# Patient Record
Sex: FEMALE | Race: WHITE | Employment: UNEMPLOYED | URBAN - METROPOLITAN AREA
[De-identification: names, ages, dates, MRNs, and addresses within clinical notes are randomized per-mention and may not be internally consistent; named-entity substitution may affect disease eponyms.]

---

## 2018-09-26 PROCEDURE — 86480 TB TEST CELL IMMUN MEASURE: CPT | Performed by: INTERNAL MEDICINE

## 2018-09-26 PROCEDURE — 86704 HEP B CORE ANTIBODY TOTAL: CPT | Performed by: INTERNAL MEDICINE

## 2018-09-26 PROCEDURE — 80074 ACUTE HEPATITIS PANEL: CPT | Performed by: INTERNAL MEDICINE

## 2019-10-07 PROCEDURE — 86480 TB TEST CELL IMMUN MEASURE: CPT | Performed by: INTERNAL MEDICINE

## 2023-03-20 ENCOUNTER — HOSPITAL ENCOUNTER (OUTPATIENT)
Age: 38
Discharge: HOME | End: 2023-03-20
Payer: MEDICAID

## 2023-03-20 VITALS
OXYGEN SATURATION: 98 % | HEART RATE: 89 BPM | RESPIRATION RATE: 20 BRPM | DIASTOLIC BLOOD PRESSURE: 52 MMHG | SYSTOLIC BLOOD PRESSURE: 108 MMHG | TEMPERATURE: 97 F

## 2023-03-20 DIAGNOSIS — H66.92 LEFT OTITIS MEDIA, UNSPECIFIED OTITIS MEDIA TYPE: Primary | ICD-10-CM

## 2023-03-20 LAB — S PYO AG THROAT QL IA.RAPID: NEGATIVE

## 2023-03-20 PROCEDURE — 87651 STREP A DNA AMP PROBE: CPT | Performed by: NURSE PRACTITIONER

## 2023-03-20 PROCEDURE — 99203 OFFICE O/P NEW LOW 30 MIN: CPT

## 2023-03-20 RX ORDER — CEFDINIR 300 MG/1
300 CAPSULE ORAL 2 TIMES DAILY
Qty: 20 CAPSULE | Refills: 0 | Status: SHIPPED | OUTPATIENT
Start: 2023-03-20 | End: 2023-03-30

## 2023-03-20 NOTE — DISCHARGE INSTRUCTIONS
Tylenol or Motrin as needed for pain or fever. Take the cefdinir as prescribed. Follow-up with your doctor. Return for any concerns.

## 2023-03-22 ENCOUNTER — HOSPITAL ENCOUNTER (EMERGENCY)
Facility: HOSPITAL | Age: 38
Discharge: HOME | End: 2023-03-22
Attending: EMERGENCY MEDICINE
Payer: MEDICAID

## 2023-03-22 VITALS
HEIGHT: 61 IN | TEMPERATURE: 97 F | WEIGHT: 255 LBS | BODY MASS INDEX: 48.15 KG/M2 | SYSTOLIC BLOOD PRESSURE: 114 MMHG | RESPIRATION RATE: 16 BRPM | DIASTOLIC BLOOD PRESSURE: 70 MMHG | HEART RATE: 91 BPM | OXYGEN SATURATION: 98 %

## 2023-03-22 DIAGNOSIS — H72.92 ACUTE OTITIS MEDIA OF LEFT EAR WITH PERFORATED TYMPANIC MEMBRANE: Primary | ICD-10-CM

## 2023-03-22 DIAGNOSIS — H66.92 ACUTE OTITIS MEDIA OF LEFT EAR WITH PERFORATED TYMPANIC MEMBRANE: Primary | ICD-10-CM

## 2023-03-22 LAB
FLUAV + FLUBV RNA SPEC NAA+PROBE: NEGATIVE
FLUAV + FLUBV RNA SPEC NAA+PROBE: NEGATIVE
RSV RNA SPEC NAA+PROBE: NEGATIVE
SARS-COV-2 RNA RESP QL NAA+PROBE: NOT DETECTED

## 2023-03-22 PROCEDURE — 99284 EMERGENCY DEPT VISIT MOD MDM: CPT

## 2023-03-22 PROCEDURE — 0241U SARS-COV-2/FLU A AND B/RSV BY PCR (GENEXPERT): CPT | Performed by: EMERGENCY MEDICINE

## 2023-03-22 PROCEDURE — 99283 EMERGENCY DEPT VISIT LOW MDM: CPT

## 2023-03-22 RX ORDER — CIPROFLOXACIN AND DEXAMETHASONE 3; 1 MG/ML; MG/ML
4 SUSPENSION/ DROPS AURICULAR (OTIC) 2 TIMES DAILY
Qty: 1 EACH | Refills: 0 | Status: SHIPPED | OUTPATIENT
Start: 2023-03-22 | End: 2023-04-01

## 2023-03-22 RX ORDER — AMOXICILLIN AND CLAVULANATE POTASSIUM 875; 125 MG/1; MG/1
1 TABLET, FILM COATED ORAL 2 TIMES DAILY
Qty: 14 TABLET | Refills: 0 | Status: SHIPPED | OUTPATIENT
Start: 2023-03-22 | End: 2023-03-29

## 2023-03-22 NOTE — ED INITIAL ASSESSMENT (HPI)
Left ear pain since Saturday, was seen at [facility] and sent home with abx, states pain I worse. today reports increase throat & ear pain

## 2024-06-10 ENCOUNTER — HOSPITAL ENCOUNTER (INPATIENT)
Facility: HOSPITAL | Age: 39
LOS: 4 days | Discharge: HOME | DRG: 862 | End: 2024-06-14
Attending: EMERGENCY MEDICINE | Admitting: HOSPITALIST
Payer: MEDICAID

## 2024-06-10 ENCOUNTER — APPOINTMENT (OUTPATIENT)
Dept: CT IMAGING | Facility: HOSPITAL | Age: 39
DRG: 862 | End: 2024-06-10
Attending: EMERGENCY MEDICINE
Payer: MEDICAID

## 2024-06-10 DIAGNOSIS — D72.829 LEUKOCYTOSIS, UNSPECIFIED TYPE: ICD-10-CM

## 2024-06-10 DIAGNOSIS — K65.1 INTRA-ABDOMINAL ABSCESS (HCC): Primary | ICD-10-CM

## 2024-06-10 LAB
ALBUMIN SERPL-MCNC: 4.4 G/DL (ref 3.2–4.8)
ALBUMIN/GLOB SERPL: 1.4 {RATIO} (ref 1–2)
ALP LIVER SERPL-CCNC: 138 U/L
ALT SERPL-CCNC: 21 U/L
ANION GAP SERPL CALC-SCNC: 10 MMOL/L (ref 0–18)
AST SERPL-CCNC: 26 U/L (ref ?–34)
BASOPHILS # BLD AUTO: 0.05 X10(3) UL (ref 0–0.2)
BASOPHILS NFR BLD AUTO: 0.3 %
BILIRUB SERPL-MCNC: 0.8 MG/DL (ref 0.3–1.2)
BUN BLD-MCNC: 12 MG/DL (ref 9–23)
BUN/CREAT SERPL: 13.8 (ref 10–20)
CALCIUM BLD-MCNC: 9.2 MG/DL (ref 8.7–10.4)
CHLORIDE SERPL-SCNC: 105 MMOL/L (ref 98–112)
CO2 SERPL-SCNC: 23 MMOL/L (ref 21–32)
CREAT BLD-MCNC: 0.87 MG/DL
DEPRECATED RDW RBC AUTO: 42.6 FL (ref 35.1–46.3)
EGFRCR SERPLBLD CKD-EPI 2021: 86 ML/MIN/1.73M2 (ref 60–?)
EOSINOPHIL # BLD AUTO: 0.1 X10(3) UL (ref 0–0.7)
EOSINOPHIL NFR BLD AUTO: 0.5 %
ERYTHROCYTE [DISTWIDTH] IN BLOOD BY AUTOMATED COUNT: 11.9 % (ref 11–15)
GLOBULIN PLAS-MCNC: 3.1 G/DL (ref 2–3.5)
GLUCOSE BLD-MCNC: 178 MG/DL (ref 70–99)
GLUCOSE BLDC GLUCOMTR-MCNC: 112 MG/DL (ref 70–99)
HCT VFR BLD AUTO: 43.3 %
HGB BLD-MCNC: 14.2 G/DL
IMM GRANULOCYTES # BLD AUTO: 0.09 X10(3) UL (ref 0–1)
IMM GRANULOCYTES NFR BLD: 0.5 %
LACTATE SERPL-SCNC: 1.5 MMOL/L (ref 0.5–2)
LIPASE SERPL-CCNC: 48 U/L (ref 13–75)
LYMPHOCYTES # BLD AUTO: 3.2 X10(3) UL (ref 1–4)
LYMPHOCYTES NFR BLD AUTO: 16.1 %
MCH RBC QN AUTO: 31.5 PG (ref 26–34)
MCHC RBC AUTO-ENTMCNC: 32.8 G/DL (ref 31–37)
MCV RBC AUTO: 96 FL
MONOCYTES # BLD AUTO: 1.02 X10(3) UL (ref 0.1–1)
MONOCYTES NFR BLD AUTO: 5.1 %
NEUTROPHILS # BLD AUTO: 15.39 X10 (3) UL (ref 1.5–7.7)
NEUTROPHILS # BLD AUTO: 15.39 X10(3) UL (ref 1.5–7.7)
NEUTROPHILS NFR BLD AUTO: 77.5 %
OSMOLALITY SERPL CALC.SUM OF ELEC: 290 MOSM/KG (ref 275–295)
PLATELET # BLD AUTO: 284 10(3)UL (ref 150–450)
POTASSIUM SERPL-SCNC: 3.8 MMOL/L (ref 3.5–5.1)
PROT SERPL-MCNC: 7.5 G/DL (ref 5.7–8.2)
RBC # BLD AUTO: 4.51 X10(6)UL
SODIUM SERPL-SCNC: 138 MMOL/L (ref 136–145)
WBC # BLD AUTO: 19.9 X10(3) UL (ref 4–11)

## 2024-06-10 PROCEDURE — 99223 1ST HOSP IP/OBS HIGH 75: CPT | Performed by: HOSPITALIST

## 2024-06-10 PROCEDURE — 74177 CT ABD & PELVIS W/CONTRAST: CPT | Performed by: EMERGENCY MEDICINE

## 2024-06-10 RX ORDER — MORPHINE SULFATE 4 MG/ML
2 INJECTION, SOLUTION INTRAMUSCULAR; INTRAVENOUS EVERY 2 HOUR PRN
Status: DISCONTINUED | OUTPATIENT
Start: 2024-06-10 | End: 2024-06-14

## 2024-06-10 RX ORDER — ONDANSETRON 2 MG/ML
4 INJECTION INTRAMUSCULAR; INTRAVENOUS EVERY 6 HOURS PRN
Status: DISCONTINUED | OUTPATIENT
Start: 2024-06-10 | End: 2024-06-14

## 2024-06-10 RX ORDER — DULAGLUTIDE 3 MG/.5ML
3 INJECTION, SOLUTION SUBCUTANEOUS
COMMUNITY
Start: 2024-06-11

## 2024-06-10 RX ORDER — ACETAMINOPHEN 500 MG
500 TABLET ORAL EVERY 4 HOURS PRN
Status: DISCONTINUED | OUTPATIENT
Start: 2024-06-10 | End: 2024-06-11

## 2024-06-10 RX ORDER — SODIUM CHLORIDE 9 MG/ML
INJECTION, SOLUTION INTRAVENOUS CONTINUOUS
Status: DISCONTINUED | OUTPATIENT
Start: 2024-06-10 | End: 2024-06-12

## 2024-06-10 RX ORDER — KETOROLAC TROMETHAMINE 15 MG/ML
15 INJECTION, SOLUTION INTRAMUSCULAR; INTRAVENOUS ONCE
Status: COMPLETED | OUTPATIENT
Start: 2024-06-10 | End: 2024-06-10

## 2024-06-10 RX ORDER — NICOTINE POLACRILEX 4 MG
30 LOZENGE BUCCAL
Status: DISCONTINUED | OUTPATIENT
Start: 2024-06-10 | End: 2024-06-14

## 2024-06-10 RX ORDER — MORPHINE SULFATE 4 MG/ML
4 INJECTION, SOLUTION INTRAMUSCULAR; INTRAVENOUS EVERY 2 HOUR PRN
Status: DISCONTINUED | OUTPATIENT
Start: 2024-06-10 | End: 2024-06-14

## 2024-06-10 RX ORDER — DEXTROSE MONOHYDRATE 25 G/50ML
50 INJECTION, SOLUTION INTRAVENOUS
Status: DISCONTINUED | OUTPATIENT
Start: 2024-06-10 | End: 2024-06-14

## 2024-06-10 RX ORDER — PROCHLORPERAZINE EDISYLATE 5 MG/ML
5 INJECTION INTRAMUSCULAR; INTRAVENOUS EVERY 8 HOURS PRN
Status: DISCONTINUED | OUTPATIENT
Start: 2024-06-10 | End: 2024-06-14

## 2024-06-10 RX ORDER — EMPAGLIFLOZIN 25 MG/1
25 TABLET, FILM COATED ORAL DAILY
COMMUNITY

## 2024-06-10 RX ORDER — ARIPIPRAZOLE 15 MG/1
15 TABLET ORAL DAILY
COMMUNITY

## 2024-06-10 RX ORDER — MORPHINE SULFATE 4 MG/ML
1 INJECTION, SOLUTION INTRAMUSCULAR; INTRAVENOUS EVERY 2 HOUR PRN
Status: DISCONTINUED | OUTPATIENT
Start: 2024-06-10 | End: 2024-06-14

## 2024-06-10 RX ORDER — NICOTINE POLACRILEX 4 MG
15 LOZENGE BUCCAL
Status: DISCONTINUED | OUTPATIENT
Start: 2024-06-10 | End: 2024-06-14

## 2024-06-10 RX ORDER — TEMAZEPAM 15 MG/1
15 CAPSULE ORAL NIGHTLY PRN
Status: DISCONTINUED | OUTPATIENT
Start: 2024-06-10 | End: 2024-06-14

## 2024-06-10 RX ORDER — MELOXICAM 15 MG/1
15 TABLET ORAL DAILY PRN
COMMUNITY

## 2024-06-10 RX ORDER — MORPHINE SULFATE 4 MG/ML
4 INJECTION, SOLUTION INTRAMUSCULAR; INTRAVENOUS ONCE
Status: COMPLETED | OUTPATIENT
Start: 2024-06-10 | End: 2024-06-10

## 2024-06-10 NOTE — H&P
[facility]    PATIENT'S NAME: ALFRED ESTRADA   ATTENDING PHYSICIAN: Chang Royal MD   PATIENT ACCOUNT#:   [id number]    LOCATION:  [address]  MEDICAL RECORD #:   [id number]       YOB: 1985  ADMISSION DATE:       06/10/2024    HISTORY AND PHYSICAL EXAMINATION    CHIEF COMPLAINT:  Intraabdominal abscess.    HISTORY OF PRESENT ILLNESS:  Patient is a 38-year-old  female who presented today to the emergency department for evaluation of progressive lower abdominal pain for the last 5 days.  CBC showed white blood cell count of 19.9 with left shift.  Chemistry and liver function tests were unremarkable.  Lipase was negative.  CT scan of the abdomen showed 3.8 x 3.4 x 4.2 cm localized fluid collection in the midline of the lower pelvis inferior to the distal sigmoid colon and superior to the vaginal cuff with surrounding fat stranding, collection is concerning for an abscess.  The adjacent rectosigmoid wall is mildly thickened with some adjacent fat stranding, could be reactive.  No evidence of pneumoperitoneum or pneumatosis.  Patient was started on IV Zosyn.  She will be admitted to the hospital for further management.    PAST MEDICAL HISTORY:  Obesity, diabetes mellitus type 2, hypertension, hyperlipidemia, psoriatic arthritis, obstructive sleep apnea.    PAST SURGICAL HISTORY:  Total abdominal hysterectomy, bilateral salpingo-oophorectomy for early-stage endometrial cancer.     MEDICATIONS:  Please see medication reconciliation list.      ALLERGIES:  No known drug allergies.    FAMILY HISTORY:  Positive for hypertension and diabetes mellitus type 2.    SOCIAL HISTORY:  Smokes less than 1/2 pack a day.  Rare alcohol use.  No drug use.      REVIEW OF SYSTEMS:  Patient reports around 2 weeks ago she had vaginal intercourse for the first time since her hysterectomy last year and, during intercourse and after, she had profuse vaginal bleeding which eventually stopped and ever  since she has been having deep discomfort in her pelvis which has been progressive.  It became worse in the last 5 days, feeling bloated and constipated.  She had some night sweats.  Other 12-point review of systems is negative.        PHYSICAL EXAMINATION:    GENERAL:  Alert, oriented to time, place, and person.  Moderate distress.   VITAL SIGNS:  Temperature 98.0, pulse 103, respiratory rate 18, blood pressure 133/57, pulse ox 96% on room air.    HEENT:  Atraumatic.  Oropharynx clear.  Dry mucous membranes.   NECK:  Supple.  No lymphadenopathy.  Trachea midline.  Full range of motion.    LUNGS:  Clear to auscultation bilaterally.  Normal respiratory effort.   HEART:  Regular rate and rhythm.  S1, S2 auscultated.  No murmur.    ABDOMEN:  Soft, nondistended.  Tenderness noted mid-lower abdomen to deep palpation.  No guarding or rebound tenderness.  Hypoactive bowel sounds.    EXTREMITIES:  No peripheral edema, clubbing, or cyanosis.    NEUROLOGIC:  Motor and sensory intact.    ASSESSMENT:    1.   Midline deep pelvic abscess between vaginal cuff and inferior aspect of the sigmoid colon with thickening of the sigmoid colon adjacent to the fluid collection, possibly reactive.  Rule out vaginal cuff rupture from above-mentioned history.  2.   Diabetes mellitus type 2.  3.   Obesity.  4.   Hypertension.    PLAN:  Patient will be admitted to general medical floor.  IV Zosyn, IV fluids, clear liquid diet.  Interventional radiology consult to evaluate patient for drain catheter.  Also obtain OB/GYN consult and general surgery consult.  Monitor hemodynamic status.  Monitor Accu-Cheks.  Pain control.  Further recommendations to follow.    Dictated By Shahzad Larson MD  d: 06/10/2024 18:21:49  t: 06/10/2024 18:28:35  Job [id number]  /

## 2024-06-10 NOTE — ED INITIAL ASSESSMENT (HPI)
Patient complains of lower abd pain and lower back pain which has been progressive for the past few days, states she had a hysterectomy last year, she had had intercourse for the first time post hysterectomy and began having bleeding, was checked for that but states it still hurts

## 2024-06-11 ENCOUNTER — APPOINTMENT (OUTPATIENT)
Dept: PICC SERVICES | Facility: HOSPITAL | Age: 39
DRG: 862 | End: 2024-06-11
Attending: HOSPITALIST
Payer: MEDICAID

## 2024-06-11 LAB
ANION GAP SERPL CALC-SCNC: 6 MMOL/L (ref 0–18)
BASOPHILS # BLD AUTO: 0.04 X10(3) UL (ref 0–0.2)
BASOPHILS NFR BLD AUTO: 0.2 %
BUN BLD-MCNC: 6 MG/DL (ref 9–23)
BUN/CREAT SERPL: 8.1 (ref 10–20)
CALCIUM BLD-MCNC: 8.7 MG/DL (ref 8.7–10.4)
CHLORIDE SERPL-SCNC: 107 MMOL/L (ref 98–112)
CO2 SERPL-SCNC: 26 MMOL/L (ref 21–32)
CREAT BLD-MCNC: 0.74 MG/DL
DEPRECATED RDW RBC AUTO: 41.3 FL (ref 35.1–46.3)
EGFRCR SERPLBLD CKD-EPI 2021: 105 ML/MIN/1.73M2 (ref 60–?)
EOSINOPHIL # BLD AUTO: 0.17 X10(3) UL (ref 0–0.7)
EOSINOPHIL NFR BLD AUTO: 1.1 %
ERYTHROCYTE [DISTWIDTH] IN BLOOD BY AUTOMATED COUNT: 11.6 % (ref 11–15)
EST. AVERAGE GLUCOSE BLD GHB EST-MCNC: 134 MG/DL (ref 68–126)
GLUCOSE BLD-MCNC: 151 MG/DL (ref 70–99)
GLUCOSE BLDC GLUCOMTR-MCNC: 106 MG/DL (ref 70–99)
GLUCOSE BLDC GLUCOMTR-MCNC: 138 MG/DL (ref 70–99)
GLUCOSE BLDC GLUCOMTR-MCNC: 158 MG/DL (ref 70–99)
GLUCOSE BLDC GLUCOMTR-MCNC: 200 MG/DL (ref 70–99)
HBA1C MFR BLD: 6.3 % (ref ?–5.7)
HCT VFR BLD AUTO: 39.2 %
HGB BLD-MCNC: 12.7 G/DL
IMM GRANULOCYTES # BLD AUTO: 0.08 X10(3) UL (ref 0–1)
IMM GRANULOCYTES NFR BLD: 0.5 %
LYMPHOCYTES # BLD AUTO: 3.04 X10(3) UL (ref 1–4)
LYMPHOCYTES NFR BLD AUTO: 18.8 %
MCH RBC QN AUTO: 31.4 PG (ref 26–34)
MCHC RBC AUTO-ENTMCNC: 32.4 G/DL (ref 31–37)
MCV RBC AUTO: 96.8 FL
MONOCYTES # BLD AUTO: 1.13 X10(3) UL (ref 0.1–1)
MONOCYTES NFR BLD AUTO: 7 %
NEUTROPHILS # BLD AUTO: 11.67 X10 (3) UL (ref 1.5–7.7)
NEUTROPHILS # BLD AUTO: 11.67 X10(3) UL (ref 1.5–7.7)
NEUTROPHILS NFR BLD AUTO: 72.4 %
OSMOLALITY SERPL CALC.SUM OF ELEC: 289 MOSM/KG (ref 275–295)
PLATELET # BLD AUTO: 277 10(3)UL (ref 150–450)
POTASSIUM SERPL-SCNC: 4 MMOL/L (ref 3.5–5.1)
RBC # BLD AUTO: 4.05 X10(6)UL
SODIUM SERPL-SCNC: 139 MMOL/L (ref 136–145)
WBC # BLD AUTO: 16.1 X10(3) UL (ref 4–11)

## 2024-06-11 PROCEDURE — 99233 SBSQ HOSP IP/OBS HIGH 50: CPT | Performed by: HOSPITALIST

## 2024-06-11 RX ORDER — HYDROCODONE BITARTRATE AND ACETAMINOPHEN 7.5; 325 MG/1; MG/1
1 TABLET ORAL EVERY 6 HOURS PRN
Status: DISCONTINUED | OUTPATIENT
Start: 2024-06-11 | End: 2024-06-14

## 2024-06-11 RX ORDER — HEPARIN SODIUM 5000 [USP'U]/ML
5000 INJECTION, SOLUTION INTRAVENOUS; SUBCUTANEOUS EVERY 8 HOURS SCHEDULED
Status: DISCONTINUED | OUTPATIENT
Start: 2024-06-11 | End: 2024-06-14

## 2024-06-11 RX ORDER — ACETAMINOPHEN 500 MG
500 TABLET ORAL EVERY 4 HOURS PRN
Status: DISCONTINUED | OUTPATIENT
Start: 2024-06-11 | End: 2024-06-14

## 2024-06-11 NOTE — PLAN OF CARE
No acute changes. Vaginal fluid discharge early afternoon, IR notified. Up independently in room. All safety measures in place.    Problem: Patient Centered Care  Goal: Patient preferences are identified and integrated in the patient's plan of care  Description: Interventions:  - What would you like us to know as we care for you? Home with family  - Provide timely, complete, and accurate information to patient/family  - Incorporate patient and family knowledge, values, beliefs, and cultural backgrounds into the planning and delivery of care  - Encourage patient/family to participate in care and decision-making at the level they choose  - Honor patient and family perspectives and choices  Outcome: Progressing       Problem: GASTROINTESTINAL - ADULT  Goal: Maintains or returns to baseline bowel function  Description: INTERVENTIONS:  - Assess bowel function  - Maintain adequate hydration with IV or PO as ordered and tolerated  - Evaluate effectiveness of GI medications  - Encourage mobilization and activity  - Obtain nutritional consult as needed  - Establish a toileting routine/schedule  - Consider collaborating with pharmacy to review patient's medication profile  Outcome: Progressing     Problem: METABOLIC/FLUID AND ELECTROLYTES - ADULT  Goal: Glucose maintained within prescribed range  Description: INTERVENTIONS:  - Monitor Blood Glucose as ordered  - Assess for signs and symptoms of hyperglycemia and hypoglycemia  - Administer ordered medications to maintain glucose within target range  - Assess barriers to adequate nutritional intake and initiate nutrition consult as needed  - Instruct patient on self management of diabetes  Outcome: Progressing     Problem: PAIN - ADULT  Goal: Verbalizes/displays adequate comfort level or patient's stated pain goal  Description: INTERVENTIONS:  - Encourage pt to monitor pain and request assistance  - Assess pain using appropriate pain scale  - Administer analgesics based on type  and severity of pain and evaluate response  - Implement non-pharmacological measures as appropriate and evaluate response  - Consider cultural and social influences on pain and pain management  - Manage/alleviate anxiety  - Utilize distraction and/or relaxation techniques  - Monitor for opioid side effects  - Notify MD/LIP if interventions unsuccessful or patient reports new pain  - Anticipate increased pain with activity and pre-medicate as appropriate  Outcome: Progressing     Problem: SAFETY ADULT - FALL  Goal: Free from fall injury  Description: INTERVENTIONS:  - Assess pt frequently for physical needs  - Identify cognitive and physical deficits and behaviors that affect risk of falls.  - [place] fall precautions as indicated by assessment.  - Educate pt/family on patient safety including physical limitations  - Instruct pt to call for assistance with activity based on assessment  - Modify environment to reduce risk of injury  - Provide assistive devices as appropriate  - Consider OT/PT consult to assist with strengthening/mobility  - Encourage toileting schedule  Outcome: Progressing

## 2024-06-11 NOTE — PROGRESS NOTES
[facility]  part of [facility]    OB/GYNE Progress Note      Linda Unger Patient Status:  Inpatient    1985 MRN [id number]   Location [facility] 1W Attending Amy Akers MD   Hosp Day # 1 PCP ALEXEY MANZANARES     Assessment & Plan:     Intra-abdominal abscess (HCC)  Suspect spontaneous drainage.  Continue abx and plan repeat imaging in few days      Leukocytosis, unspecified type  improved          Plan discussed with patient who verbalizes understanding and agreement.    Subjective:     Reports feeling better after spontaneous drainage of fluid.          Objective:   Vital signs in last 24 hours:  Temp:  [97.7 °F (36.5 °C)-99.3 °F (37.4 °C)] 98.9 °F (37.2 °C)  Pulse:  [] 96  Resp:  [18] 18  BP: (106-128)/(57-77) 109/57  SpO2:  [92 %-97 %] 92 %    Input/Output:    Intake/Output Summary (Last 24 hours) at 2024 1616  Last data filed at 2024 1532  Gross per 24 hour   Intake 1340 ml   Output --   Net 1340 ml       Weight (Last 6):  Wt Readings from Last 6 Encounters:   06/10/24 233 lb 9.6 oz (106 kg)   23 255 lb (115.7 kg)   20 249 lb (112.9 kg)   10/07/19 234 lb (106.1 kg)   04/15/19 251 lb (113.9 kg)   19 264 lb (119.7 kg)     Body mass index is 44.14 kg/m².         Gaxiola Catheter Information  Does patient have a gaxiola catheter: no          Constitutional: comfortable  Genitourinary: Labia: normal  Vagina: no discharge noted, cuff intact to palpation and visually.       Results:   Lab Results   Component Value Date    WBC 16.1 (H) 2024    HGB 12.7 2024    HCT 39.2 2024    .0 2024    CREATSERUM 0.74 2024    BUN 6 (L) 2024     2024    K 4.0 2024     2024    CO2 26.0 2024     (H) 2024    CA 8.7 2024    ALB 4.4 06/10/2024    ALKPHO 138 (H) 06/10/2024    BILT 0.8 06/10/2024    TP 7.5 06/10/2024    AST 26 06/10/2024    ALT 21 06/10/2024    LIP  48 06/10/2024       Lab Results   Component Value Date    ESRML 5 02/24/2020    CRP 0.30 02/24/2020       CT ABDOMEN+PELVIS(CONTRAST ONLY)(CPT=74177)    Result Date: 6/10/2024  CONCLUSION:   3.8 x 3.4 x 4.2 cm localized fluid collection in the midline of the lower pelvis inferior to the distal sigmoid colon and superior to the vaginal cuff with surrounding fat stranding.  This collection is concerning for an abscess.  The adjacent rectosigmoid wall is mildly thickened with some adjacent fat stranding which could be reactive or reflect a nonspecific colitis.  No evidence of pneumoperitoneum or pneumatosis.    Dictated by (CST): Robert Arroyo MD on 6/10/2024 at 5:32 PM     Finalized by (CST): Robert Arroyo MD on 6/10/2024 at 5:42 PM              Zo Jose MD  6/11/2024  4:16 PM

## 2024-06-11 NOTE — PROGRESS NOTES
GENERAL SURGEON  PT SEEN    CONSULT TO BE DICTATED  SOURCE OF ABSCESS?  DISCUSSED WITH DR. HAIRSTON

## 2024-06-11 NOTE — PROGRESS NOTES
[facility]  part of [facility]    Progress Note    Linda Unger Patient Status:  Inpatient    1985 MRN [id number]   Location [facility] 1W Attending Amy Akers MD   Hosp Day # 1 PCP ALEXEY MANZANARES     Chief Complaint: intra-abd abscess    SUBJECTIVE:    No acute events overnight.  Patient denies chest pain, sob.  No fevers/chills.  No n/v.  No worsening abd pain.      10 ROS completed and otherwise negative.    OBJECTIVE:  Vital signs in last 24 hours:  /70 (BP Location: Right arm)   Pulse 100   Temp 99.3 °F (37.4 °C) (Oral)   Resp 18   Ht 5' 1\" (1.549 m)   Wt 233 lb 9.6 oz (106 kg)   SpO2 93%   BMI 44.14 kg/m²     Intake/Output:    Intake/Output Summary (Last 24 hours) at 2024 1212  Last data filed at 2024 1100  Gross per 24 hour   Intake 840 ml   Output --   Net 840 ml       Wt Readings from Last 3 Encounters:   06/10/24 233 lb 9.6 oz (106 kg)   23 255 lb (115.7 kg)   20 249 lb (112.9 kg)       Exam     Gen: No acute distress  Pulm: Lungs clear, normal respiratory effort  CV: Heart with regular rate and rhythm  Abd: Abdomen soft, nontender, bowel sounds present  Neuro: No acute focal deficits  MSK: moves extremities  Skin: Warm and dry  Psych: Normal affect  Ext: no c/c/e    Data Review:     Labs:   Lab Results   Component Value Date    WBC 16.1 2024    HGB 12.7 2024    HCT 39.2 2024    .0 2024    CREATSERUM 0.74 2024    BUN 6 2024     2024    K 4.0 2024     2024    CO2 26.0 2024     2024    CA 8.7 2024    ALB 4.4 06/10/2024    ALKPHO 138 06/10/2024    BILT 0.8 06/10/2024    TP 7.5 06/10/2024    AST 26 06/10/2024    ALT 21 06/10/2024    LIP 48 06/10/2024       Imaging: Reviewed    CT ABDOMEN+PELVIS(CONTRAST ONLY)(CPT=74177)    Result Date: 6/10/2024  CONCLUSION:   3.8 x 3.4 x 4.2 cm localized fluid collection in the midline of  the lower pelvis inferior to the distal sigmoid colon and superior to the vaginal cuff with surrounding fat stranding.  This collection is concerning for an abscess.  The adjacent rectosigmoid wall is mildly thickened with some adjacent fat stranding which could be reactive or reflect a nonspecific colitis.  No evidence of pneumoperitoneum or pneumatosis.    Dictated by (CST): Robert Arroyo MD on 6/10/2024 at 5:32 PM     Finalized by (CST): Robert Arroyo MD on 6/10/2024 at 5:42 PM           Meds:   Current Facility-Administered Medications   Medication Dose Route Frequency    acetaminophen (Tylenol Extra Strength) tab 500 mg  500 mg Oral Q4H PRN    sodium chloride 0.9% infusion   Intravenous Continuous    ondansetron (Zofran) 4 MG/2ML injection 4 mg  4 mg Intravenous Q6H PRN    prochlorperazine (Compazine) 10 MG/2ML injection 5 mg  5 mg Intravenous Q8H PRN    morphINE PF 4 MG/ML injection 1 mg  1 mg Intravenous Q2H PRN    Or    morphINE PF 4 MG/ML injection 2 mg  2 mg Intravenous Q2H PRN    Or    morphINE PF 4 MG/ML injection 4 mg  4 mg Intravenous Q2H PRN    temazepam (Restoril) cap 15 mg  15 mg Oral Nightly PRN    piperacillin-tazobactam (Zosyn) 3.375 g in dextrose 5% 100 mL IVPB-ADDV  3.375 g Intravenous Q8H    glucose (Dex4) 15 GM/59ML oral liquid 15 g  15 g Oral Q15 Min PRN    Or    glucose (Glutose) 40% oral gel 15 g  15 g Oral Q15 Min PRN    Or    glucose-vitamin C (Dex-4) chewable tab 4 tablet  4 tablet Oral Q15 Min PRN    Or    dextrose 50% injection 50 mL  50 mL Intravenous Q15 Min PRN    Or    glucose (Dex4) 15 GM/59ML oral liquid 30 g  30 g Oral Q15 Min PRN    Or    glucose (Glutose) 40% oral gel 30 g  30 g Oral Q15 Min PRN    Or    glucose-vitamin C (Dex-4) chewable tab 8 tablet  8 tablet Oral Q15 Min PRN    insulin aspart (NovoLOG) 100 Units/mL FlexPen 1-7 Units  1-7 Units Subcutaneous TID CC         Assessment  Patient Active Problem List   Diagnosis    Psoriasis    Psoriatic arthritis (HCC)     Pre-diabetes    Glaucoma    Intra-abdominal abscess (HCC)    Leukocytosis, unspecified type       Plan:     Deep pelvic abscess  - visualized on ct abdomen  - started IV abx  - Gen Sx, IR and Ob/Gyn consulted  - seen by IR - not a candidate for IR drain at this time  - will cont IV abx for now and consider repeat CT abd/pelvis in 2-3 days  - encouraged activity as tolerated and IS    Morbid obesity   - BMI 44  - diet and exercise when more stable     DM type 2  - ISS  - accuchecks and adjust insulin as needed    HTN  - stable    HL  - fu as outpt    SARAH  - cont outpt follow up     Psoriatic Arthritis  - fu as outpt    Prophylaxis:   DVT with heparin    Dispo: pending clinical course    Global A/P  - reviewed labs and vitals from today  - reviewed notes of the day  - cbc, bmp, mag ordered for tomorrow  - discussed need to stay in the hospital today due to absce  - cont IV abx  - discussed with patient/RN and care team    MDM: high level, acute illness posing a threat to life, IV medication requiring close monitoring in hospital.     Amy Akers MD  6/11/2024  12:12 PM

## 2024-06-11 NOTE — DISCHARGE INSTRUCTIONS
Discussed with your primary care physician when you see them regarding setting up colonoscopy    --------------    Resources to help you    Smoking Cessation  [phone]    Food Pantries    [facility] [facility]  [address] [place] [address]    The Food Pantry by [facility]  [phone], ext [phone]    [place] Food [employer] and Clothes Closet  [address] [place] [address]    Travel    [employer] Medicaid Provider  [phone]    Emergency Assistance Services  By the [place]  [email or web address]    Utilities  H20 Help to Others Program  [phone]    [employer] Program  [address] [place] [address]      Infusion Due next on 6/15/24 at 1030  [facility]  Phone: [phone]

## 2024-06-11 NOTE — ED QUICK NOTES
Orders for admission, patient is aware of plan and ready to go upstairs. Any questions, please call ED RN Yovani at extension [phone].     Patient Covid vaccination status: Fully vaccinated     COVID Test Ordered in ED: None    COVID Suspicion at Admission: N/A    Running Infusions:      Mental Status/LOC at time of transport: a/o x 4    Other pertinent information:   CIWA score: N/A   NIH score:  N/A

## 2024-06-11 NOTE — CONSULTS
[facility]  part of [facility]    Ob/Gynl Consult    Linda Unger Patient Status:  Inpatient    1985 MRN [id number]   Location [facility] 1W Attending Shahzad Larson MD   Hosp Day # 0 PCP ALEXEY MANZANARES       Date of Admission:  6/10/2024    SUBJECTIVE:    Reason for Admission:  Pelvic pain, pelvic abscess    History of Present Illness:  38 year old female G0 presents to the ER complaining of worsening pelvic pain particularly with bowel movement or urination.  Patient with a history of total laparoscopic hysterectomy in  secondary to stage one Ovarian and Cervical cancer per patient.  She has been seeing Dr. Del Angel who is a gyne oncologist through \[facility]".    Patient states that about five days ago, she had a sudden onset of constipation and pelvic pain, felt like \"insides coming out\".  Denied fever or abnormal vaginal discharge.    The following history patient does not want discussed in front of any family or her boyfriend:  Patient had intercourse 24 with a different partner, states that at one point she felt a \"pop\" and had sudden onset of severe pelvic pain.  She went to the [facility] ER on 24 (can see records in Care Everywhere - [place]).  At that time, the GC/Chlamydia was negative, Trichomonas was negative.  Her WBC was 15.5.  She only had a flat plate x ray of the abdomen.  Per the ER doctor's note, the vagina showed only white discharge with no blood.    Medications:  Medications Prior to Admission   Medication Sig Dispense Refill Last Dose    STELARA 45 MG/0.5ML Subcutaneous Solution Prefilled Syringe Inject 45 mg into the skin every 3 (three) months. 1 Syringe 1     Doxycycline Hyclate 100 MG Oral Tab TK 1 T PO BID FOR ACNE AND CYSTS       calcipotriene 0.005 % External Cream JORDAN EXT AA 1 TO 2 TIMES A DAY       cetirizine 10 MG Oral Tab TK 1 T PO QD       losartan 100 MG Oral Tab TK 1 T PO  QAM       baclofen 20 MG Oral Tab   4     Flunisolide 25  MCG/ACT (0.025%) Nasal Solution   4     ergocalciferol 68341 units Oral Cap TK 1 C PO Q SUNDAY FOR GOOD HEALTH  2     lisinopril 5 MG Oral Tab TK 1 T PO QD FOR BP  0     MetFORMIN HCl 1000 MG Oral Tab TK 1 T PO  BID  2     Clobetasol Propionate 0.05 % External Ointment JORDAN AA BID  2     Calcipotriene 0.005 % External Solution APPLY TO SCALP BID FOR PSORIASIS  2     ALPRAZolam 0.5 MG Oral Tab   0     Glucose Blood (ASHLEY CONTOUR NEXT TEST) In Vitro Strip U 1 STRIP BID UTD  5     HydrOXYzine HCl 25 MG Oral Tab TK 1 T PO  QPM AFTER DINNER FOR ITCHING AND SINUSES  0     MICROLET LANCETS Does not apply Misc U 1 LANCET BID UTD  5     BuPROPion HCl ER, XL, (WELLBUTRIN XL) 300 MG Oral Tablet 24 Hr Take 300 mg by mouth daily.       HYDROcodone-acetaminophen  MG Oral Tab Take 1 tablet by mouth 2 (two) times daily.       Cholecalciferol (VITAMIN D3) 10293 UNITS Oral Cap Take by mouth. 1 capsule weekly       Clobetasol Propionate 0.05 % External Cream Apply topically.          Allergies:  No Known Allergies     Past Medical History:  Past Medical History:    Anxiety    ANA II (cervical intraepithelial neoplasia II)    Depression    Glaucoma    PCOS (polycystic ovarian syndrome)    Pre-diabetes    Psoriasis    Psoriatic arthritis (HCC)       Past Surgical History:  Past Surgical History:   Procedure Laterality Date    Tonsillectomy     10/2023 - TL/BSO done at [place] [address] - I&D abscess on right thigh  LEEP    Past OB History:  OB History   No obstetric history on file.       Past GYN History:   Hx of ovarian and cervical cancer per patient, s/p TLH/ BSO    Social History:  Social History     Tobacco Use    Smoking status: Every Day    Smokeless tobacco: Never   Substance Use Topics    Alcohol use: No     Alcohol/week: 0.0 standard drinks of alcohol        Review of Systems:  Symptoms as per HPI    OBJECTIVE:  Temp:  [97.7 °F (36.5 °C)-98 °F (36.7 °C)] 97.7 °F (36.5 °C)  Pulse:  [] 88  Resp:  [18] 18  BP:  (106-133)/(57-77) 106/77  SpO2:  [95 %-98 %] 95 %    Intake/Output Summary (Last 24 hours) at 6/10/2024 2209  Last data filed at 6/10/2024 1925  Gross per 24 hour   Intake 100 ml   Output --   Net 100 ml       Physical Exam:  Abdomen:  Soft, mild tenderness to palpation central lower pelvic, no rebound or guarding  Pelvic: external genitalia normal and speculum exam performed, small amount of white/yellow discharge, vaginal cuff could not be clearly visualized as patient couldn't tolerate opening the speculum further, but no blood noted in the vagina.    Diagnostics:  CT scan:  ABDOMINAL NODES: No mass or adenopathy.    BOWEL/MESENTERY: No dilated loops of bowel.  The appendix is unremarkable.  Just caudal to the distal sigmoid, there is an ill-defined hypodensity measuring 3.8 x 3.4 x 4.2 cm there is fat stranding surrounding this hypodense structure concerning for   fluid collection/abscess.  No definite air seen within this localized fluid collection.  This fluid collection appears to be some located above the level of the vaginal cuff.  The adjacent distal sigmoid colon and wall appears mildly thickened and there   is some mild adjacent fat stranding.  There is no evidence of pneumatosis or pneumoperitoneum.     Data Review:   Lab Results   Component Value Date    WBC 19.9 06/10/2024    HGB 14.2 06/10/2024    HCT 43.3 06/10/2024    .0 06/10/2024    CREATSERUM 0.87 06/10/2024    BUN 12 06/10/2024     06/10/2024    K 3.8 06/10/2024     06/10/2024    CO2 23.0 06/10/2024     06/10/2024    CA 9.2 06/10/2024    ALB 4.4 06/10/2024    ALKPHO 138 06/10/2024    BILT 0.8 06/10/2024    TP 7.5 06/10/2024    AST 26 06/10/2024    ALT 21 06/10/2024    LIP 48 06/10/2024       ASSESSMENT:  Pelvic pain  Pelvic abscess    PLAN:  Unclear etiology of her pelvic abscess, possibly there was trauma to the vaginal cuff with intercourse which ultimately led to a vaginal cuff cellulitis and abscess.  Or, the  etiology is from the sigmoid.  In either case, agree with starting antibiotics and interventional radiology assessment for drainage of abscess.    All of the findings and plan were discussed with the patient.  She notes understanding and agrees with the plan of care.  All questions were answered to the best of my ability at this time.    Zo Malloy MD  6/10/2024  10:09 PM

## 2024-06-11 NOTE — CM/SW NOTE
06/11/24 1158   SDOH Outreach Status   SDOH Referred for Food Resource;Transportation;Tobacco/Alcohol/Substance;Utilities   SDOH Resource Details   SDOH Resource (Transportation) Transport services through Medicaid, Emergency Assistance Services by the [facility],   SDOH Resource (Food) Food Pantries [employer] [facility], [facility], [place] Food [employer] and Clothes Closet   SDOH Resource (Utilities) [employer] Program, Low Income Home Energy Assistance Program   SDOH Resource (Tobacco/Alcohol/Substance) Smoking Cessaton Support     Information provided on AVS for patient to manage at [place].    Awilda Milan MBA BSN RN CRRN   RN Case Manager  [phone]

## 2024-06-11 NOTE — ED PROVIDER NOTES
Patient Seen in: [facility] 1w    History     Chief Complaint   Patient presents with    Abdomen/Flank Pain     Stated Complaint: back and abdominal pain    HPI    Patient complains of bilateral lower abdominal pain that began couple days ago.  Reports 2 weeks ago she had intercourse for the first time since having hysterectomy a year ago.  She states it was extremely painful, had some vaginal bleeding afterward as well.  Was evaluated seen in outside ER.  States she got a little bit better but then over the past few days pain is returned and intensified..  Pain described as sharp aching.  Pain rated as 9/10.  Modifying factors include: none.          Past Medical History:    Anxiety    ANA II (cervical intraepithelial neoplasia II)    Depression    Glaucoma    PCOS (polycystic ovarian syndrome)    Pre-diabetes    Psoriasis    Psoriatic arthritis (HCC)       Past Surgical History:   Procedure Laterality Date    Tonsillectomy              Family History   Family history unknown: Yes       Social History     Socioeconomic History    Marital status: Single   Tobacco Use    Smoking status: Every Day    Smokeless tobacco: Never   Vaping Use    Vaping status: Never Used   Substance and Sexual Activity    Alcohol use: No     Alcohol/week: 0.0 standard drinks of alcohol    Drug use: Yes     Types: Cannabis     Social Determinants of Health     Financial Resource Strain: High Risk (2/28/2024)    Received from [facility]    Overall Financial Resource Strain (CARDIA)     Difficulty of Paying Living Expenses: Very hard   Food Insecurity: Food Insecurity Present (2/28/2024)    Received from [facility]    Hunger Vital Sign     Worried About Running Out of Food in the Last Year: Often true     Ran Out of Food in the Last Year: Often true   Transportation Needs: Unmet Transportation Needs (2/28/2024)    Received from [facility]    PRAPARE - Transportation      Lack of Transportation (Medical): Yes     Lack of Transportation (Non-Medical): Yes   Housing Stability: High Risk (2/28/2024)    Received from [facility]    Housing Stability Vital Sign     Unable to Pay for Housing in the Last Year: Yes     In the last 12 months, was there a time when you did not have a steady place to sleep or slept in a shelter (including now)?: No       Review of Systems    Positive for stated complaint: back and abdominal pain  Other systems are as noted in HPI.  Constitutional and vital signs reviewed.      All other systems reviewed and negative except as noted above.    PSFH elements reviewed from today and agreed except as otherwise stated in HPI.    Physical Exam     ED Triage Vitals [06/10/24 1505]   /57   Pulse 113   Resp 18   Temp 98 °F (36.7 °C)   Temp src Oral   SpO2 98 %   O2 Device None (Room air)       Current:/77 (BP Location: Right arm)   Pulse 88   Temp 97.7 °F (36.5 °C) (Oral)   Resp 18   Ht 154.9 cm (5' 1\")   Wt 105.9 kg   SpO2 95%   BMI 44.10 kg/m²   Pulse ox nl        Physical Exam  General Appearance: alert, no distress  Eyes: pupils equal and round no pallor or injection  ENT, Mouth: mucous membranes moist  Respiratory: there are no retractions, lungs are clear to auscultation  Cardiovascular: regular rate and rhythm    Gastrointestinal: obese, soft tender blq and suprapubic region with vol guarding  Neurological: II-XII grossly intact  no focal deficits  Skin: warm and dry, no rashes.  Musculoskeletal: neck is supple non tender        Extremities are symmetrical, full range of motion  Psychiatric: patient is pleasant, there is no agitation    DIFFERENTIAL DIAGNOSIS: After history and physical exam differential diagnosis was considered for diverticulitis versus cystitis versus PID          ED Course     Labs Reviewed   COMP METABOLIC PANEL (14) - Abnormal; Notable for the following components:       Result Value    Glucose 178 (*)      Alkaline Phosphatase 138 (*)     All other components within normal limits   CBC W/ DIFFERENTIAL - Abnormal; Notable for the following components:    WBC 19.9 (*)     Neutrophil Absolute Prelim 15.39 (*)     Neutrophil Absolute 15.39 (*)     Monocyte Absolute 1.02 (*)     All other components within normal limits   LIPASE - Normal   LACTIC ACID, PLASMA - Normal   CBC WITH DIFFERENTIAL WITH PLATELET    Narrative:     The following orders were created for panel order CBC With Differential With Platelet.  Procedure                               Abnormality         Status                     ---------                               -----------         ------                     CBC W/ DIFFERENTIAL[[id number]]          Abnormal            Final result                 Please view results for these tests on the individual orders.   BLOOD CULTURE   BLOOD CULTURE   AEROBIC BACTERIAL CULTURE   ANAEROBIC CULTURE       MDM         Cardiac Monitor:   Pulse Readings from Last 1 Encounters:   06/10/24 88   , sinus, 88  interpreted by me.    Radiology findings:  I personally reviewed the images. CT ABDOMEN+PELVIS(CONTRAST ONLY)(CPT=74177)    Result Date: 6/10/2024  CONCLUSION:   3.8 x 3.4 x 4.2 cm localized fluid collection in the midline of the lower pelvis inferior to the distal sigmoid colon and superior to the vaginal cuff with surrounding fat stranding.  This collection is concerning for an abscess.  The adjacent rectosigmoid wall is mildly thickened with some adjacent fat stranding which could be reactive or reflect a nonspecific colitis.  No evidence of pneumoperitoneum or pneumatosis.    Dictated by (CST): Robert Arroyo MD on 6/10/2024 at 5:32 PM     Finalized by (CST): Robert Arroyo MD on 6/10/2024 at 5:42 PM                 Medical Decision Making  Problems Addressed:  Intra-abdominal abscess (HCC): acute illness or injury     Details: Unclear if this is related to recent traumatic intercourse and is at the  vaginal cuff versus connected to the sigmoid colon.  Consulted surgery Dr. Ku and Dr. Malloy from OB.  Also spoke with IR Dr. Grayson Dave in the ER for admission IV antibiotics initiated.    Amount and/or Complexity of Data Reviewed  External Data Reviewed: notes.     Details: Reviewed encounter at [facility] for pelvic pain after intercourse.  Labs: ordered. Decision-making details documented in ED Course.  Radiology: ordered. Decision-making details documented in ED Course.    Risk  Decision regarding hospitalization.            Disposition and Plan     Clinical Impression:  1. Intra-abdominal abscess (HCC)    2. Leukocytosis, unspecified type        Disposition:  Admit    Follow-up:  No follow-up provider specified.    Medications Prescribed:  Current Discharge Medication List          Hospital Problems       Present on Admission  Date Reviewed: 2/24/2020            ICD-10-CM Noted POA    * (Principal) Intra-abdominal abscess (HCC) K65.1 6/10/2024 Unknown    Leukocytosis, unspecified type D72.829 6/10/2024 Unknown

## 2024-06-11 NOTE — PLAN OF CARE
Bed locked in low position, belongings in reach, call light in reach. Frequent rounding done, all patient needs met. Non-slip socks on/at bedside. Blood Cx pending. Vaginal Cx send last night (aerobic & anaerobic) result pending.  IV zosyn started. IR on consult for possible drainage of the abscess, also general surgery. Up ad cindy. Has been having significant pain, meds given, see MAR. On clear liquid diet.    Problem: Patient Centered Care  Goal: Patient preferences are identified and integrated in the patient's plan of care  Description: Interventions:  - What would you like us to know as we care for you? I live with my boyfriend, and need to know what this pain in my abdomen/back is from.  - Provide timely, complete, and accurate information to patient/family  - Incorporate patient and family knowledge, values, beliefs, and cultural backgrounds into the planning and delivery of care  - Encourage patient/family to participate in care and decision-making at the level they choose  - Honor patient and family perspectives and choices  Outcome: Progressing     Problem: GASTROINTESTINAL - ADULT  Goal: Maintains or returns to baseline bowel function  Description: INTERVENTIONS:  - Assess bowel function  - Maintain adequate hydration with IV or PO as ordered and tolerated  - Evaluate effectiveness of GI medications  - Encourage mobilization and activity  - Obtain nutritional consult as needed  - Establish a toileting routine/schedule  - Consider collaborating with pharmacy to review patient's medication profile  Outcome: Progressing     Problem: METABOLIC/FLUID AND ELECTROLYTES - ADULT  Goal: Glucose maintained within prescribed range  Description: INTERVENTIONS:  - Monitor Blood Glucose as ordered  - Assess for signs and symptoms of hyperglycemia and hypoglycemia  - Administer ordered medications to maintain glucose within target range  - Assess barriers to adequate nutritional intake and initiate nutrition consult as  needed  - Instruct patient on self management of diabetes  Outcome: Progressing     Problem: PAIN - ADULT  Goal: Verbalizes/displays adequate comfort level or patient's stated pain goal  Description: INTERVENTIONS:  - Encourage pt to monitor pain and request assistance  - Assess pain using appropriate pain scale  - Administer analgesics based on type and severity of pain and evaluate response  - Implement non-pharmacological measures as appropriate and evaluate response  - Consider cultural and social influences on pain and pain management  - Manage/alleviate anxiety  - Utilize distraction and/or relaxation techniques  - Monitor for opioid side effects  - Notify MD/LIP if interventions unsuccessful or patient reports new pain  - Anticipate increased pain with activity and pre-medicate as appropriate  Outcome: Progressing     Problem: SAFETY ADULT - FALL  Goal: Free from fall injury  Description: INTERVENTIONS:  - Assess pt frequently for physical needs  - Identify cognitive and physical deficits and behaviors that affect risk of falls.  - [place] fall precautions as indicated by assessment.  - Educate pt/family on patient safety including physical limitations  - Instruct pt to call for assistance with activity based on assessment  - Modify environment to reduce risk of injury  - Provide assistive devices as appropriate  - Consider OT/PT consult to assist with strengthening/mobility  - Encourage toileting schedule  Outcome: Progressing

## 2024-06-11 NOTE — CONSULTS
[facility]  part of [facility]    Report of Consultation    Linda Unger Patient Status:  Inpatient    1985 MRN [id number]   Location [facility] 1W Attending Amy Akers MD   Hosp Day # 1 PCP ALEXEY MANZANARES     Reason for Consultation:  Pelvic abscess    History of Present Illness:  Linda Unger is a a(n) 38 year old female who presents with progressive lower abdominal pain and urinary frequency.  CT shows lower pelvic fluid collection.      History:  Past Medical History:    Anxiety    ANA II (cervical intraepithelial neoplasia II)    Depression    Glaucoma    PCOS (polycystic ovarian syndrome)    Pre-diabetes    Psoriasis    Psoriatic arthritis (HCC)     Past Surgical History:   Procedure Laterality Date    Tonsillectomy       Family History   Family history unknown: Yes      reports that she has been smoking. She has never used smokeless tobacco. She reports current drug use. Drug: Cannabis. She reports that she does not drink alcohol.    Allergies:  No Known Allergies    Medications:    Current Facility-Administered Medications:     acetaminophen (Tylenol Extra Strength) tab 500 mg, 500 mg, Oral, Q4H PRN    sodium chloride 0.9% infusion, , Intravenous, Continuous    ondansetron (Zofran) 4 MG/2ML injection 4 mg, 4 mg, Intravenous, Q6H PRN    prochlorperazine (Compazine) 10 MG/2ML injection 5 mg, 5 mg, Intravenous, Q8H PRN    morphINE PF 4 MG/ML injection 1 mg, 1 mg, Intravenous, Q2H PRN **OR** morphINE PF 4 MG/ML injection 2 mg, 2 mg, Intravenous, Q2H PRN **OR** morphINE PF 4 MG/ML injection 4 mg, 4 mg, Intravenous, Q2H PRN    temazepam (Restoril) cap 15 mg, 15 mg, Oral, Nightly PRN    piperacillin-tazobactam (Zosyn) 3.375 g in dextrose 5% 100 mL IVPB-ADDV, 3.375 g, Intravenous, Q8H    glucose (Dex4) 15 GM/59ML oral liquid 15 g, 15 g, Oral, Q15 Min PRN **OR** glucose (Glutose) 40% oral gel 15 g, 15 g, Oral, Q15 Min PRN **OR** glucose-vitamin C (Dex-4)  chewable tab 4 tablet, 4 tablet, Oral, Q15 Min PRN **OR** dextrose 50% injection 50 mL, 50 mL, Intravenous, Q15 Min PRN **OR** glucose (Dex4) 15 GM/59ML oral liquid 30 g, 30 g, Oral, Q15 Min PRN **OR** glucose (Glutose) 40% oral gel 30 g, 30 g, Oral, Q15 Min PRN **OR** glucose-vitamin C (Dex-4) chewable tab 8 tablet, 8 tablet, Oral, Q15 Min PRN    insulin aspart (NovoLOG) 100 Units/mL FlexPen 1-7 Units, 1-7 Units, Subcutaneous, TID CC    Review of Systems:  Pertinent items are noted in HPI.    Physical Exam:   General: Alert, orientated x3.  Cooperative.  No apparent distress.  Vital Signs:  Blood pressure 121/70, pulse 100, temperature 99.3 °F (37.4 °C), temperature source Oral, resp. rate 18, height 61\", weight 233 lb 9.6 oz (106 kg), SpO2 93%.  HEENT: Exam is unremarkable.  Neck: Supple.  Lungs: Normal respiratory effort  Cardiac: Regular rate and rhythm.  Abdomen:  Low tenderness.  Extremities:  No lower extremity edema noted.  Skin: Normal texture and turgor.    Laboratory Data:  Lab Results   Component Value Date    WBC 16.1 06/11/2024    HGB 12.7 06/11/2024    HCT 39.2 06/11/2024    .0 06/11/2024    CREATSERUM 0.74 06/11/2024    BUN 6 06/11/2024     06/11/2024    K 4.0 06/11/2024     06/11/2024    CO2 26.0 06/11/2024     06/11/2024    CA 8.7 06/11/2024    ALB 4.4 06/10/2024    ALKPHO 138 06/10/2024    BILT 0.8 06/10/2024    TP 7.5 06/10/2024    AST 26 06/10/2024    ALT 21 06/10/2024    LIP 48 06/10/2024       Imaging:  CT ABDOMEN+PELVIS(CONTRAST ONLY)(CPT=74177)    Result Date: 6/10/2024  CONCLUSION:   3.8 x 3.4 x 4.2 cm localized fluid collection in the midline of the lower pelvis inferior to the distal sigmoid colon and superior to the vaginal cuff with surrounding fat stranding.  This collection is concerning for an abscess.  The adjacent rectosigmoid wall is mildly thickened with some adjacent fat stranding which could be reactive or reflect a nonspecific colitis.  No evidence of  pneumoperitoneum or pneumatosis.    Dictated by (CST): Robert Arroyo MD on 6/10/2024 at 5:32 PM     Finalized by (CST): Robert Arroyo MD on 6/10/2024 at 5:42 PM           Impression:  Patient Active Problem List   Diagnosis    Psoriasis    Psoriatic arthritis (HCC)    Pre-diabetes    Glaucoma    Intra-abdominal abscess (HCC)    Leukocytosis, unspecified type       Assessment/Plan:  39yo admitted with low abdominal pain.  CT shows 3.8 x 3.4 x 4.2 cm localized fluid collection in the midline of the lower pelvis inferior to the distal sigmoid colon and superior to the vaginal cuff with surrounding fat stranding. CT reviewed -fluid is not organized.  Recommend continue antibiotics, repeat imaging later this week.      Thank you for allowing me to participate in the care of your patient.    RENU MCMILLAN, APRN  6/11/2024  9:37 AM

## 2024-06-12 LAB
ANION GAP SERPL CALC-SCNC: 4 MMOL/L (ref 0–18)
BASOPHILS # BLD AUTO: 0.05 X10(3) UL (ref 0–0.2)
BASOPHILS NFR BLD AUTO: 0.6 %
BUN BLD-MCNC: <5 MG/DL (ref 9–23)
C DIFF TOX B STL QL: NEGATIVE
CALCIUM BLD-MCNC: 9.3 MG/DL (ref 8.7–10.4)
CHLORIDE SERPL-SCNC: 109 MMOL/L (ref 98–112)
CO2 SERPL-SCNC: 29 MMOL/L (ref 21–32)
CREAT BLD-MCNC: 0.68 MG/DL
DEPRECATED RDW RBC AUTO: 40.3 FL (ref 35.1–46.3)
EGFRCR SERPLBLD CKD-EPI 2021: 113 ML/MIN/1.73M2 (ref 60–?)
EOSINOPHIL # BLD AUTO: 0.25 X10(3) UL (ref 0–0.7)
EOSINOPHIL NFR BLD AUTO: 2.8 %
ERYTHROCYTE [DISTWIDTH] IN BLOOD BY AUTOMATED COUNT: 11.5 % (ref 11–15)
GLUCOSE BLD-MCNC: 148 MG/DL (ref 70–99)
GLUCOSE BLDC GLUCOMTR-MCNC: 112 MG/DL (ref 70–99)
GLUCOSE BLDC GLUCOMTR-MCNC: 153 MG/DL (ref 70–99)
GLUCOSE BLDC GLUCOMTR-MCNC: 166 MG/DL (ref 70–99)
GLUCOSE BLDC GLUCOMTR-MCNC: 170 MG/DL (ref 70–99)
HCT VFR BLD AUTO: 38.2 %
HGB BLD-MCNC: 12.9 G/DL
IMM GRANULOCYTES # BLD AUTO: 0.04 X10(3) UL (ref 0–1)
IMM GRANULOCYTES NFR BLD: 0.4 %
LYMPHOCYTES # BLD AUTO: 2.92 X10(3) UL (ref 1–4)
LYMPHOCYTES NFR BLD AUTO: 32.2 %
MAGNESIUM SERPL-MCNC: 2 MG/DL (ref 1.6–2.6)
MCH RBC QN AUTO: 32.3 PG (ref 26–34)
MCHC RBC AUTO-ENTMCNC: 33.8 G/DL (ref 31–37)
MCV RBC AUTO: 95.5 FL
MONOCYTES # BLD AUTO: 0.47 X10(3) UL (ref 0.1–1)
MONOCYTES NFR BLD AUTO: 5.2 %
NEUTROPHILS # BLD AUTO: 5.35 X10 (3) UL (ref 1.5–7.7)
NEUTROPHILS # BLD AUTO: 5.35 X10(3) UL (ref 1.5–7.7)
NEUTROPHILS NFR BLD AUTO: 58.8 %
PLATELET # BLD AUTO: 257 10(3)UL (ref 150–450)
POTASSIUM SERPL-SCNC: 4.2 MMOL/L (ref 3.5–5.1)
RBC # BLD AUTO: 4 X10(6)UL
SODIUM SERPL-SCNC: 142 MMOL/L (ref 136–145)
WBC # BLD AUTO: 9.1 X10(3) UL (ref 4–11)

## 2024-06-12 PROCEDURE — 99233 SBSQ HOSP IP/OBS HIGH 50: CPT | Performed by: HOSPITALIST

## 2024-06-12 NOTE — PROGRESS NOTES
[facility]  part of [facility]  Hospitalist Progress Note     Linda Unger Patient Status:  Inpatient    1985  38 year old CSN [id number]   Location 103103-A Attending Patric Grande MD   Hosp Day # 2 PCP ALEXEY MANZANARES     Assessment & Plan:   ----------------------------------  Pelvic abscess.  Ultimate etiology unclear though may be related to recent intercourse.  May have spontaneously drained on .  Noticed significant increase in discharge followed by relief of pain.  White count has normalized.  -Gynecology, general surgery, IR consult appreciated  -Reimaging in 1 to 2 days  -Continue Zosyn  -Pain control  -Increase activity    Other problems  Morbid obesity BMI 44  Type 2 diabetes  Hypertension  Dyslipidemia  SARAH  Psoriatic arthritis    dvt prophylaxis: sc heparin  code status: full code  dispo: home upon medical clearance    I personally reviewed the available laboratories, imaging including CT. I discussed/will discuss the case with surgery. I ordered laboratories, studies including CBC. I adjusted medications including Zosyn. Medical decision making high, risk is high.    >55min spent, >50% spent counseling and coordinating care in the form of educating pt/family and d/w consultants and staff. Most of the time spent discussing the above plan.       Subjective:   ----------------------------------  Low pelvic pain a lot better.  No further discharge after yesterday afternoon      Objective:   Chief Complaint:   Chief Complaint   Patient presents with    Abdomen/Flank Pain     ----------------------------------  Temp:  [97.7 °F (36.5 °C)-98.9 °F (37.2 °C)] 97.7 °F (36.5 °C)  Pulse:  [83-96] 86  Resp:  [18] 18  BP: (109-130)/(57-71) 130/71  SpO2:  [92 %-97 %] 97 %  Gen: A+Ox3.  No distress.   HEENT: NCAT, neck supple, no carotid bruit.  CV: RRR, S1S2, and intact distal pulses. No gallop, rub, murmur.  Pulm: Effort and breath sounds normal. No distress, wheezes, rales,  rhonchi.  Abd: Soft, NTND, BS normal, no mass, no HSM, no rebound/guarding.   Neuro: Normal reflexes, CN. Sensory/motor exams grossly normal deficit.   MS: No joint effusions.  No peripheral edema.  Skin: Skin is warm and dry. No rashes, erythema, diaphoresis.   Psych: Normal mood and affect. Calm, cooperative    Labs:  Lab Results   Component Value Date    HGB 12.9 06/12/2024    WBC 9.1 06/12/2024    .0 06/12/2024     06/12/2024    K 4.2 06/12/2024    CREATSERUM 0.68 06/12/2024    AST 26 06/10/2024    ALT 21 06/10/2024          heparin  5,000 Units Subcutaneous Q8H MANNIE    piperacillin-tazobactam  3.375 g Intravenous Q8H    insulin aspart  1-7 Units Subcutaneous TID CC       acetaminophen    HYDROcodone-acetaminophen    ondansetron    prochlorperazine    morphINE **OR** morphINE **OR** morphINE    temazepam    glucose **OR** glucose **OR** glucose-vitamin C **OR** dextrose **OR** glucose **OR** glucose **OR** glucose-vitamin C

## 2024-06-12 NOTE — PROGRESS NOTES
[facility]  part of [facility]    Progress Note    Linda Unger Patient Status:  Inpatient    1985 MRN [id number]   Location [facility] 1W Attending Patric Grande MD   Hosp Day # 2 PCP ALEXEY MANZANARES     Subjective:  Feels better     Objective/Physical Exam:  General: Alert, orientated x3.  Cooperative.  No apparent distress.  Vital Signs:  Blood pressure 130/71, pulse 86, temperature 97.7 °F (36.5 °C), temperature source Oral, resp. rate 18, height 5' 1\" (1.549 m), weight 233 lb 9.6 oz (106 kg), SpO2 97%.  Abdomen:  Soft, non-distended, non-tender,  Labs:  Lab Results   Component Value Date    WBC 9.1 2024    HGB 12.9 2024    HCT 38.2 2024    .0 2024    CREATSERUM 0.68 2024    BUN <5 (L) 2024     2024    K 4.2 2024     2024    CO2 29.0 2024     (H) 2024    CA 9.3 2024    ALB 4.4 06/10/2024    ALKPHO 138 (H) 06/10/2024    BILT 0.8 06/10/2024    TP 7.5 06/10/2024    AST 26 06/10/2024    ALT 21 06/10/2024    LIP 48 06/10/2024    ESRML 5 2020    CRP 0.30 2020    MG 2.0 2024       Imaging:  CT ABDOMEN+PELVIS(CONTRAST ONLY)(CPT=74177)    Result Date: 6/10/2024  PROCEDURE: CT ABDOMEN + PELVIS (CONTRAST ONLY) (CPT=74177)  COMPARISON: None.  INDICATIONS: back and abdominal pain  TECHNIQUE: CT images of the abdomen and pelvis were obtained with non-ionic intravenous contrast material.  Automated exposure control for dose reduction was used. Adjustment of the mA and/or kV was done based on the patient's size. Use of iterative reconstruction technique for dose reduction was used.  Dose information is transmitted to the ACR (American College of Radiology) NRDR (National Radiology Data Registry) which includes the Dose Index Registry.  FINDINGS:  LUNG BASES: No focal consolidation. LIVER: Probable steatosis. SPLEEN: No enlargement or focal lesion.  GALLBLADDER: No  cholelithiasis. PANCREAS: No lesion, fluid collection, ductal dilatation, or atrophy.  ADRENALS: No defined mass or abnormal enlargement.  KIDNEYS: Enhance symmetrically without hydronephrosis. AORTA/VASCULAR:   Abdominal aorta is normal in caliber. ABDOMINAL NODES: No mass or adenopathy.  BOWEL/MESENTERY:  No dilated loops of bowel.  The appendix is unremarkable.  Just caudal to the distal sigmoid, there is an ill-defined hypodensity measuring 3.8 x 3.4 x 4.2 cm there is fat stranding surrounding this hypodense structure concerning for fluid collection/abscess.  No definite air seen within this localized fluid collection.  This fluid collection appears to be some located above the level of the vaginal cuff.  The adjacent distal sigmoid colon and wall appears mildly thickened and there is some mild adjacent fat stranding.  There is no evidence of pneumatosis or pneumoperitoneum. ABDOMINAL WALL: Unremarkable. URINARY BLADDER: No focal wall thickening or calculus.  PELVIC NODES: No adenopathy.   PELVIC ORGANS: Surgically absent uterus. BONES:   Spondylosis lower thoracic and lumbar spine. OTHER: Negative.          CONCLUSION:   3.8 x 3.4 x 4.2 cm localized fluid collection in the midline of the lower pelvis inferior to the distal sigmoid colon and superior to the vaginal cuff with surrounding fat stranding.  This collection is concerning for an abscess.  The adjacent rectosigmoid wall is mildly thickened with some adjacent fat stranding which could be reactive or reflect a nonspecific colitis.  No evidence of pneumoperitoneum or pneumatosis.    Dictated by (CST): Robert Arroyo MD on 6/10/2024 at 5:32 PM     Finalized by (CST): Robert Arroyo MD on 6/10/2024 at 5:42 PM            Assessment/Plan:  Patient Active Problem List   Diagnosis    Psoriasis    Psoriatic arthritis (HCC)    Pre-diabetes    Glaucoma    Intra-abdominal abscess (HCC)    Leukocytosis, unspecified type   9.0 wbc  Will repeat CT with oral  contrast  to r/o bowel involvement  Etiology of drainage  ?          REINA MEDINA MD  6/12/2024  12:25 PM

## 2024-06-12 NOTE — PLAN OF CARE
Problem: Patient Centered Care  Goal: Patient preferences are identified and integrated in the patient's plan of care  Description: Interventions:  - What would you like us to know as we care for you? Patient is from home with family   - Provide timely, complete, and accurate information to patient/family  - Incorporate patient and family knowledge, values, beliefs, and cultural backgrounds into the planning and delivery of care  - Encourage patient/family to participate in care and decision-making at the level they choose  - Honor patient and family perspectives and choices  Outcome: Progressing     Problem: Patient/Family Goals  Goal: Patient/Family Long Term Goal  Description: Patient's Long Term Goal: To feel better     Interventions  - See additional Care Plan goals for specific interventions  Outcome: Progressing  Goal: Patient/Family Short Term Goal  Description: Patient's Short Term Goal: To go home    Interventions:   - See additional Care Plan goals for specific interventions  Outcome: Progressing     Problem: GASTROINTESTINAL - ADULT  Goal: Maintains or returns to baseline bowel function  Description: INTERVENTIONS:  - Assess bowel function  - Maintain adequate hydration with IV or PO as ordered and tolerated  - Evaluate effectiveness of GI medications  - Encourage mobilization and activity  - Obtain nutritional consult as needed  - Establish a toileting routine/schedule  - Consider collaborating with pharmacy to review patient's medication profile  Outcome: Progressing     Problem: METABOLIC/FLUID AND ELECTROLYTES - ADULT  Goal: Glucose maintained within prescribed range  Description: INTERVENTIONS:  - Monitor Blood Glucose as ordered  - Assess for signs and symptoms of hyperglycemia and hypoglycemia  - Administer ordered medications to maintain glucose within target range  - Assess barriers to adequate nutritional intake and initiate nutrition consult as needed  - Instruct patient on self management  of diabetes  Outcome: Progressing     Problem: PAIN - ADULT  Goal: Verbalizes/displays adequate comfort level or patient's stated pain goal  Description: INTERVENTIONS:  - Encourage pt to monitor pain and request assistance  - Assess pain using appropriate pain scale  - Administer analgesics based on type and severity of pain and evaluate response  - Implement non-pharmacological measures as appropriate and evaluate response  - Consider cultural and social influences on pain and pain management  - Manage/alleviate anxiety  - Utilize distraction and/or relaxation techniques  - Monitor for opioid side effects  - Notify MD/LIP if interventions unsuccessful or patient reports new pain  - Anticipate increased pain with activity and pre-medicate as appropriate  Outcome: Progressing     Problem: SAFETY ADULT - FALL  Goal: Free from fall injury  Description: INTERVENTIONS:  - Assess pt frequently for physical needs  - Identify cognitive and physical deficits and behaviors that affect risk of falls.  - [place] fall precautions as indicated by assessment.  - Educate pt/family on patient safety including physical limitations  - Instruct pt to call for assistance with activity based on assessment  - Modify environment to reduce risk of injury  - Provide assistive devices as appropriate  - Consider OT/PT consult to assist with strengthening/mobility  - Encourage toileting schedule  Outcome: Progressing     Problem: Diabetes/Glucose Control  Goal: Glucose maintained within prescribed range  Description: INTERVENTIONS:  - Monitor Blood Glucose as ordered  - Assess for signs and symptoms of hyperglycemia and hypoglycemia  - Administer ordered medications to maintain glucose within target range  - Assess barriers to adequate nutritional intake and initiate nutrition consult as needed  - Instruct patient on self management of diabetes  Outcome: Progressing   Patient with minimal abdominal pain overnight. No vaginal discharge  reported per patient. IVF infusing. IV antibiotics. VSS. Will continue to monitor patient.

## 2024-06-12 NOTE — PLAN OF CARE
No acute changes. Ambulating independently. Plan for CT abd tomorrow morning.     Problem: Patient Centered Care  Goal: Patient preferences are identified and integrated in the patient's plan of care  Description: Interventions:  - What would you like us to know as we care for you? My birthday is coming up   - Provide timely, complete, and accurate information to patient/family  - Incorporate patient and family knowledge, values, beliefs, and cultural backgrounds into the planning and delivery of care  - Encourage patient/family to participate in care and decision-making at the level they choose  - Honor patient and family perspectives and choices  Outcome: Progressing     Problem: GASTROINTESTINAL - ADULT  Goal: Maintains or returns to baseline bowel function  Description: INTERVENTIONS:  - Assess bowel function  - Maintain adequate hydration with IV or PO as ordered and tolerated  - Evaluate effectiveness of GI medications  - Encourage mobilization and activity  - Obtain nutritional consult as needed  - Establish a toileting routine/schedule  - Consider collaborating with pharmacy to review patient's medication profile  Outcome: Progressing     Problem: METABOLIC/FLUID AND ELECTROLYTES - ADULT  Goal: Glucose maintained within prescribed range  Description: INTERVENTIONS:  - Monitor Blood Glucose as ordered  - Assess for signs and symptoms of hyperglycemia and hypoglycemia  - Administer ordered medications to maintain glucose within target range  - Assess barriers to adequate nutritional intake and initiate nutrition consult as needed  - Instruct patient on self management of diabetes  Outcome: Progressing     Problem: PAIN - ADULT  Goal: Verbalizes/displays adequate comfort level or patient's stated pain goal  Description: INTERVENTIONS:  - Encourage pt to monitor pain and request assistance  - Assess pain using appropriate pain scale  - Administer analgesics based on type and severity of pain and evaluate  response  - Implement non-pharmacological measures as appropriate and evaluate response  - Consider cultural and social influences on pain and pain management  - Manage/alleviate anxiety  - Utilize distraction and/or relaxation techniques  - Monitor for opioid side effects  - Notify MD/LIP if interventions unsuccessful or patient reports new pain  - Anticipate increased pain with activity and pre-medicate as appropriate  Outcome: Progressing     Problem: SAFETY ADULT - FALL  Goal: Free from fall injury  Description: INTERVENTIONS:  - Assess pt frequently for physical needs  - Identify cognitive and physical deficits and behaviors that affect risk of falls.  - [place] fall precautions as indicated by assessment.  - Educate pt/family on patient safety including physical limitations  - Instruct pt to call for assistance with activity based on assessment  - Modify environment to reduce risk of injury  - Provide assistive devices as appropriate  - Consider OT/PT consult to assist with strengthening/mobility  - Encourage toileting schedule  Outcome: Progressing     Problem: Diabetes/Glucose Control  Goal: Glucose maintained within prescribed range  Description: INTERVENTIONS:  - Monitor Blood Glucose as ordered  - Assess for signs and symptoms of hyperglycemia and hypoglycemia  - Administer ordered medications to maintain glucose within target range  - Assess barriers to adequate nutritional intake and initiate nutrition consult as needed  - Instruct patient on self management of diabetes  Outcome: Progressing

## 2024-06-12 NOTE — PROGRESS NOTES
[facility]  part of [facility]    OB/GYNE Progress Note      Linda Unger Patient Status:  Inpatient    1985 MRN [id number]   Location [facility] 1W Attending Patric Grande MD   Hosp Day # 2 PCP ALEXEY MANZANARES     Date:  2024, 2:52 PM    Subjective:  Feeling better. Sitting comfortably at bedside eating chips      Objective:    Vitals:    24 1940 24 0609 24 0737 24 1447   BP: 123/65 116/62 130/71 140/81   BP Location: Left arm Left arm Left arm Left arm   Pulse: 92 83 86 72   Resp: 18 18 18 18   Temp: 97.7 °F (36.5 °C) 97.8 °F (36.6 °C) 97.7 °F (36.5 °C) 97.6 °F (36.4 °C)   TempSrc: Oral Oral Oral Oral   SpO2: 96% 96% 97% 97%   Weight:       Height:           Intake/Output Summary (Last 24 hours) at 2024 1452  Last data filed at 2024 1447  Gross per 24 hour   Intake 4567 ml   Output --   Net 4567 ml         Physical Exam:  AVSS  General:  alert, cooperative, no distress    Abdomen:  BS+, soft, ND, mild tenderness in all quadrants. No rebound or guarding.    Labs / Path / Radiology:    Recent Results (from the past 24 hour(s))   Aerobic Bacterial Culture    Collection Time: 24  4:37 PM    Specimen: Vaginal; Other   Result Value Ref Range    Aerobic Culture Result No Growth at 18-24 hrs.     Aerobic Smear No WBCs seen     Aerobic Smear No organisms seen    POCT Glucose    Collection Time: 24  5:06 PM   Result Value Ref Range    POC Glucose  138 (H) 70 - 99 mg/dL   POCT Glucose    Collection Time: 24  9:12 PM   Result Value Ref Range    POC Glucose  200 (H) 70 - 99 mg/dL   POCT Glucose    Collection Time: 24  7:34 AM   Result Value Ref Range    POC Glucose  153 (H) 70 - 99 mg/dL   Basic Metabolic Panel (8)    Collection Time: 24  7:57 AM   Result Value Ref Range    Glucose 148 (H) 70 - 99 mg/dL    Sodium 142 136 - 145 mmol/L    Potassium 4.2 3.5 - 5.1 mmol/L    Chloride 109 98 - 112 mmol/L    CO2 29.0 21.0 -  32.0 mmol/L    Anion Gap 4 0 - 18 mmol/L    BUN <5 (L) 9 - 23 mg/dL    Creatinine 0.68 0.55 - 1.02 mg/dL    BUN/CREA Ratio      Calcium, Total 9.3 8.7 - 10.4 mg/dL    Calculated Osmolality      eGFR-Cr 113 >=60 mL/min/1.73m2   Magnesium    Collection Time: 06/12/24  7:57 AM   Result Value Ref Range    Magnesium 2.0 1.6 - 2.6 mg/dL   CBC W/ DIFFERENTIAL    Collection Time: 06/12/24  7:57 AM   Result Value Ref Range    WBC 9.1 4.0 - 11.0 x10(3) uL    RBC 4.00 3.80 - 5.30 x10(6)uL    HGB 12.9 12.0 - 16.0 g/dL    HCT 38.2 35.0 - 48.0 %    MCV 95.5 80.0 - 100.0 fL    MCH 32.3 26.0 - 34.0 pg    MCHC 33.8 31.0 - 37.0 g/dL    RDW-SD 40.3 35.1 - 46.3 fL    RDW 11.5 11.0 - 15.0 %    .0 150.0 - 450.0 10(3)uL    Neutrophil Absolute Prelim 5.35 1.50 - 7.70 x10 (3) uL    Neutrophil Absolute 5.35 1.50 - 7.70 x10(3) uL    Lymphocyte Absolute 2.92 1.00 - 4.00 x10(3) uL    Monocyte Absolute 0.47 0.10 - 1.00 x10(3) uL    Eosinophil Absolute 0.25 0.00 - 0.70 x10(3) uL    Basophil Absolute 0.05 0.00 - 0.20 x10(3) uL    Immature Granulocyte Absolute 0.04 0.00 - 1.00 x10(3) uL    Neutrophil % 58.8 %    Lymphocyte % 32.2 %    Monocyte % 5.2 %    Eosinophil % 2.8 %    Basophil % 0.6 %    Immature Granulocyte % 0.4 %   Clostridium difficile(toxigenic)PCR    Collection Time: 06/12/24  9:11 AM    Specimen: Stool   Result Value Ref Range    C. Difficile Toxin B Gene Negative Negative   POCT Glucose    Collection Time: 06/12/24 12:24 PM   Result Value Ref Range    POC Glucose  170 (H) 70 - 99 mg/dL       Specimens (From admission, onward)      None            6/10/24 CT:   3.8 x 3.4 x 4.2 cm localized fluid collection in the midline of the lower pelvis inferior to the distal sigmoid colon and superior to the vaginal cuff with surrounding fat stranding.  This collection is concerning for an abscess.  The adjacent   rectosigmoid wall is mildly thickened with some adjacent fat stranding which could be reactive or reflect a nonspecific  colitis.     Assessment/Plan:  38 year old G0 who presented with pelvic pain s/p TLH 10/2023, now with intra-abdominal infection on antibiotics (Zosyn day 3)  Feeling better. Plan for repeat CT with contrast tomorrow AM.       Janeth Flores MD   [unfilled] 2:52 PM

## 2024-06-12 NOTE — PAYOR COMM NOTE
6/10 & 6/11 NOTES  ADMISSION REVIEW     Payor: [facility]  Subscriber #:  [id number]  Authorization Number: [id number]    Admit date: 6/10/24  Admit time:  7:50 PM       REVIEW DOCUMENTATION:     ED Provider Notes        ED Provider Notes signed by Chang Royal MD at 6/10/2024  8:38 PM       Author: Chang Royal MD Service: -- Author Type: Physician    Filed: 6/10/2024  8:38 PM Date of Service: 6/10/2024  8:36 PM Status: Signed    : Chang Royal MD (Physician)           Patient Seen in: [facility] 1w    History     Chief Complaint   Patient presents with    Abdomen/Flank Pain     Stated Complaint: back and abdominal pain    HPI    Patient complains of bilateral lower abdominal pain that began couple days ago.  Reports 2 weeks ago she had intercourse for the first time since having hysterectomy a year ago.  She states it was extremely painful, had some vaginal bleeding afterward as well.  Was evaluated seen in outside ER.  States she got a little bit better but then over the past few days pain is returned and intensified..  Pain described as sharp aching.  Pain rated as 9/10.  Modifying factors include: none.          Past Medical History:    Anxiety    ANA II (cervical intraepithelial neoplasia II)    Depression    Glaucoma    PCOS (polycystic ovarian syndrome)    Pre-diabetes    Psoriasis    Psoriatic arthritis (HCC)       Past Surgical History:   Procedure Laterality Date    Tonsillectomy              Family History   Family history unknown: Yes       Social History     Socioeconomic History    Marital status: Single   Tobacco Use    Smoking status: Every Day    Smokeless tobacco: Never   Vaping Use    Vaping status: Never Used   Substance and Sexual Activity    Alcohol use: No     Alcohol/week: 0.0 standard drinks of alcohol    Drug use: Yes     Types: Cannabis     Social Determinants of Health     Financial Resource Strain: High Risk (2/28/2024)    Received  from [facility]    Overall Financial Resource Strain (CARDIA)     Difficulty of Paying Living Expenses: Very hard   Food Insecurity: Food Insecurity Present (2/28/2024)    Received from [facility]    Hunger Vital Sign     Worried About Running Out of Food in the Last Year: Often true     Ran Out of Food in the Last Year: Often true   Transportation Needs: Unmet Transportation Needs (2/28/2024)    Received from [facility]    PRAPARE - Transportation     Lack of Transportation (Medical): Yes     Lack of Transportation (Non-Medical): Yes   Housing Stability: High Risk (2/28/2024)    Received from [facility]    Housing Stability Vital Sign     Unable to Pay for Housing in the Last Year: Yes     In the last 12 months, was there a time when you did not have a steady place to sleep or slept in a shelter (including now)?: No       Review of Systems    Positive for stated complaint: back and abdominal pain  Other systems are as noted in HPI.  Constitutional and vital signs reviewed.      All other systems reviewed and negative except as noted above.    PSFH elements reviewed from today and agreed except as otherwise stated in HPI.    Physical Exam     ED Triage Vitals [06/10/24 1505]   /57   Pulse 113   Resp 18   Temp 98 °F (36.7 °C)   Temp src Oral   SpO2 98 %   O2 Device None (Room air)       Current:/77 (BP Location: Right arm)   Pulse 88   Temp 97.7 °F (36.5 °C) (Oral)   Resp 18   Ht 154.9 cm (5' 1\")   Wt 105.9 kg   SpO2 95%   BMI 44.10 kg/m²   Pulse ox nl        Physical Exam  General Appearance: alert, no distress  Eyes: pupils equal and round no pallor or injection  ENT, Mouth: mucous membranes moist  Respiratory: there are no retractions, lungs are clear to auscultation  Cardiovascular: regular rate and rhythm    Gastrointestinal: obese, soft tender blq and suprapubic region with vol guarding  Neurological:  II-XII grossly intact  no focal deficits  Skin: warm and dry, no rashes.  Musculoskeletal: neck is supple non tender        Extremities are symmetrical, full range of motion  Psychiatric: patient is pleasant, there is no agitation    DIFFERENTIAL DIAGNOSIS: After history and physical exam differential diagnosis was considered for diverticulitis versus cystitis versus PID          ED Course     Labs Reviewed   COMP METABOLIC PANEL (14) - Abnormal; Notable for the following components:       Result Value    Glucose 178 (*)     Alkaline Phosphatase 138 (*)     All other components within normal limits   CBC W/ DIFFERENTIAL - Abnormal; Notable for the following components:    WBC 19.9 (*)     Neutrophil Absolute Prelim 15.39 (*)     Neutrophil Absolute 15.39 (*)     Monocyte Absolute 1.02 (*)     All other components within normal limits   LIPASE - Normal   LACTIC ACID, PLASMA - Normal   CBC WITH DIFFERENTIAL WITH PLATELET    Narrative:     The following orders were created for panel order CBC With Differential With Platelet.  Procedure                               Abnormality         Status                     ---------                               -----------         ------                     CBC W/ DIFFERENTIAL[[id number]]          Abnormal            Final result                 Please view results for these tests on the individual orders.   BLOOD CULTURE   BLOOD CULTURE   AEROBIC BACTERIAL CULTURE   ANAEROBIC CULTURE       MDM         Cardiac Monitor:   Pulse Readings from Last 1 Encounters:   06/10/24 88   , sinus, 88  interpreted by me.    Radiology findings:  I personally reviewed the images. CT ABDOMEN+PELVIS(CONTRAST ONLY)(CPT=74177)    Result Date: 6/10/2024  CONCLUSION:   3.8 x 3.4 x 4.2 cm localized fluid collection in the midline of the lower pelvis inferior to the distal sigmoid colon and superior to the vaginal cuff with surrounding fat stranding.  This collection is concerning for an abscess.  The  adjacent rectosigmoid wall is mildly thickened with some adjacent fat stranding which could be reactive or reflect a nonspecific colitis.  No evidence of pneumoperitoneum or pneumatosis.    Dictated by (CST): Robert Arroyo MD on 6/10/2024 at 5:32 PM     Finalized by (CST): Robert Arroyo MD on 6/10/2024 at 5:42 PM                 Medical Decision Making  Problems Addressed:  Intra-abdominal abscess (HCC): acute illness or injury     Details: Unclear if this is related to recent traumatic intercourse and is at the vaginal cuff versus connected to the sigmoid colon.  Consulted surgery Dr. Ku and Dr. Malloy from OB.  Also spoke with IR Dr. Grayson Dave in the ER for admission IV antibiotics initiated.    Amount and/or Complexity of Data Reviewed  External Data Reviewed: notes.     Details: Reviewed encounter at [facility] for pelvic pain after intercourse.  Labs: ordered. Decision-making details documented in ED Course.  Radiology: ordered. Decision-making details documented in ED Course.    Risk  Decision regarding hospitalization.            Disposition and Plan     Clinical Impression:  1. Intra-abdominal abscess (HCC)    2. Leukocytosis, unspecified type        Disposition:  Admit    Follow-up:  No follow-up provider specified.    Medications Prescribed:  Current Discharge Medication List          Hospital Problems       Present on Admission  Date Reviewed: 2/24/2020            ICD-10-CM Noted POA    * (Principal) Intra-abdominal abscess (HCC) K65.1 6/10/2024 Unknown    Leukocytosis, unspecified type D72.829 6/10/2024 Unknown                  Signed by Chang Royal MD on 6/10/2024  8:38 PM         MEDICATIONS ADMINISTERED IN LAST 1 DAY:  HYDROcodone-acetaminophen (Norco) 7.5-325 MG per tab 1 tablet       Date Action Dose Route User    6/12/2024 0917 Given 1 tablet Oral Tram Strauss RN    6/11/2024 1952 Given 1 tablet Oral Alivia Velarde RN    6/11/2024 1533 Given 1 tablet Oral Carlos A  VENITA Ugadle          insulin aspart (NovoLOG) 100 Units/mL FlexPen 1-7 Units       Date Action Dose Route User    6/12/2024 0739 Given 1 Units Subcutaneous (Left Lower Abdomen) Tram Strauss RN          piperacillin-tazobactam (Zosyn) 3.375 g in dextrose 5% 100 mL IVPB-ADDV       Date Action Dose Route User    6/12/2024 0730 New Bag 3.375 g Intravenous Alivia Velarde RN    6/11/2024 2330 New Bag 3.375 g Intravenous CurAlivia joseph RN    6/11/2024 1532 New Bag 3.375 g Intravenous Tram Strauss RN            Vitals (last day)       Date/Time Temp Pulse Resp BP SpO2 Weight O2 Device O2 Flow Rate (L/min) [facility]    06/12/24 0737 97.7 °F (36.5 °C) 86 18 130/71 97 % -- None (Room air) --     06/12/24 0609 97.8 °F (36.6 °C) 83 18 116/62 96 % -- None (Room air) --     06/11/24 1940 97.7 °F (36.5 °C) 92 18 123/65 96 % -- None (Room air) --     06/11/24 1422 98.9 °F (37.2 °C) 96 18 109/57 92 % -- None (Room air) --     06/11/24 0825 99.3 °F (37.4 °C) -- -- -- -- -- -- --     06/11/24 0740 99.3 °F (37.4 °C) 100 18 121/70 93 % -- None (Room air) --     06/11/24 0314 98.7 °F (37.1 °C) 98 18 128/74 96 % -- None (Room air) -- AS           6/10 H&P    HISTORY AND PHYSICAL EXAMINATION     CHIEF COMPLAINT:  Intraabdominal abscess.     HISTORY OF PRESENT ILLNESS:  Patient is a 38-year-old  female who presented today to the emergency department for evaluation of progressive lower abdominal pain for the last 5 days.  CBC showed white blood cell count of 19.9 with left shift.  Chemistry and liver function tests were unremarkable.  Lipase was negative.  CT scan of the abdomen showed 3.8 x 3.4 x 4.2 cm localized fluid collection in the midline of the lower pelvis inferior to the distal sigmoid colon and superior to the vaginal cuff with surrounding fat stranding, collection is concerning for an abscess.  The adjacent rectosigmoid wall is mildly thickened with some adjacent fat stranding, could be reactive.  No  evidence of pneumoperitoneum or pneumatosis.  Patient was started on IV Zosyn.  She will be admitted to the hospital for further management.    VITAL SIGNS: Temperature 98.0, pulse 103, respiratory rate 18, blood pressure 133/57, pulse ox 96% on room air.       ASSESSMENT:    1.       Midline deep pelvic abscess between vaginal cuff and inferior aspect of the sigmoid colon with thickening of the sigmoid colon adjacent to the fluid collection, possibly reactive.  Rule out vaginal cuff rupture from above-mentioned history.  2.       Diabetes mellitus type 2.  3.       Obesity.  4.       Hypertension.     PLAN:  Patient will be admitted to general medical floor.  IV Zosyn, IV fluids, clear liquid diet.  Interventional radiology consult to evaluate patient for drain catheter.  Also obtain OB/GYN consult and general surgery consult.  Monitor hemodynamic status.  Monitor Accu-Cheks.  Pain control.  Further recommendations to follow.    6/10 OB/GYN CONSULT NOTE    Reason for Admission:  Pelvic pain, pelvic abscess     History of Present Illness:  38 year old female G0 presents to the ER complaining of worsening pelvic pain particularly with bowel movement or urination.  Patient with a history of total laparoscopic hysterectomy in 2023 secondary to stage one Ovarian and Cervical cancer per patient.  She has been seeing Dr. Del Angel who is a gyne oncologist through [facility].     Patient states that about five days ago, she had a sudden onset of constipation and pelvic pain, felt like \"insides coming out\".  Denied fever or abnormal vaginal discharge.      The following history patient does not want discussed in front of any family or her boyfriend:  Patient had intercourse 5/22/24 with a different partner, states that at one point she felt a \"pop\" and had sudden onset of severe pelvic pain.  She went to the [facility] ER on 5/22/24 (can see records in [facility]).  At that time, the GC/Chlamydia was negative,  Trichomonas was negative.  Her WBC was 15.5.  She only had a flat plate x ray of the abdomen.  Per the ER doctor's note, the vagina showed only white discharge with no blood.       OBJECTIVE:  Temp:  [97.7 °F (36.5 °C)-98 °F (36.7 °C)] 97.7 °F (36.5 °C)  Pulse:  [] 88  Resp:  [18] 18  BP: (106-133)/(57-77) 106/77  SpO2:  [95 %-98 %] 95 %     Intake/Output Summary (Last 24 hours) at 6/10/2024 2209  Last data filed at 6/10/2024 1925      Gross per 24 hour   Intake 100 ml   Output --   Net 100 ml         Physical Exam:  Abdomen:  Soft, mild tenderness to palpation central lower pelvic, no rebound or guarding  Pelvic: external genitalia normal and speculum exam performed, small amount of white/yellow discharge, vaginal cuff could not be clearly visualized as patient couldn't tolerate opening the speculum further, but no blood noted in the vagina.     Diagnostics:  CT scan:  ABDOMINAL NODES: No mass or adenopathy.    BOWEL/MESENTERY: No dilated loops of bowel.  The appendix is unremarkable.  Just caudal to the distal sigmoid, there is an ill-defined hypodensity measuring 3.8 x 3.4 x 4.2 cm there is fat stranding surrounding this hypodense structure concerning for   fluid collection/abscess.  No definite air seen within this localized fluid collection.  This fluid collection appears to be some located above the level of the vaginal cuff.  The adjacent distal sigmoid colon and wall appears mildly thickened and there   is some mild adjacent fat stranding.  There is no evidence of pneumatosis or pneumoperitoneum.        ASSESSMENT:  Pelvic pain  Pelvic abscess     PLAN:  Unclear etiology of her pelvic abscess, possibly there was trauma to the vaginal cuff with intercourse which ultimately led to a vaginal cuff cellulitis and abscess.  Or, the etiology is from the sigmoid.  In either case, agree with starting antibiotics and interventional radiology assessment for drainage of abscess.     All of the findings and plan  were discussed with the patient.  She notes understanding and agrees with the plan of care.  All questions were answered to the best of my ability at this time.    6/11 I.R. CONSULT NOTE    Reason for Consultation:  Pelvic abscess     History of Present Illness:  Linda Unger is a a(n) 38 year old female who presents with progressive lower abdominal pain and urinary frequency.  CT shows lower pelvic fluid collection.        Physical Exam:   General: Alert, orientated x3.  Cooperative.  No apparent distress.  Vital Signs:  Blood pressure 121/70, pulse 100, temperature 99.3 °F (37.4 °C), temperature source Oral, resp. rate 18, height 61\", weight 233 lb 9.6 oz (106 kg), SpO2 93%.  HEENT: Exam is unremarkable.  Neck: Supple.  Lungs: Normal respiratory effort  Cardiac: Regular rate and rhythm.  Abdomen:  Low tenderness.  Extremities:  No lower extremity edema noted.  Skin: Normal texture and turgor.     Laboratory Data:        Lab Results   Component Value Date     WBC 16.1 06/11/2024     HGB 12.7 06/11/2024     HCT 39.2 06/11/2024     .0 06/11/2024     CREATSERUM 0.74 06/11/2024     BUN 6 06/11/2024      06/11/2024     K 4.0 06/11/2024      06/11/2024     CO2 26.0 06/11/2024      06/11/2024     CA 8.7 06/11/2024          Imaging:  CT ABDOMEN+PELVIS(CONTRAST ONLY)(CPT=74177)     Result Date: 6/10/2024  CONCLUSION:          3.8 x 3.4 x 4.2 cm localized fluid collection in the midline of the lower pelvis inferior to the distal sigmoid colon and superior to the vaginal cuff with surrounding fat stranding.  This collection is concerning for an abscess.  The adjacent rectosigmoid wall is mildly thickened with some adjacent fat stranding which could be reactive or reflect a nonspecific colitis.  No evidence of pneumoperitoneum or pneumatosis.    Dictated by (CST): Robert Arroyo MD on 6/10/2024 at 5:32 PM     Finalized by (CST): Robert Arroyo MD on 6/10/2024 at 5:42 PM             Impression:      Patient Active Problem List   Diagnosis    Psoriasis    Psoriatic arthritis (HCC)    Pre-diabetes    Glaucoma    Intra-abdominal abscess (HCC)    Leukocytosis, unspecified type         Assessment/Plan:  39yo admitted with low abdominal pain.  CT shows 3.8 x 3.4 x 4.2 cm localized fluid collection in the midline of the lower pelvis inferior to the distal sigmoid colon and superior to the vaginal cuff with surrounding fat stranding. CT reviewed -fluid is not organized.  Recommend continue antibiotics, repeat imaging later this week.                 Attestation signed by Clark Matias MD at 6/11/2024  2:03 PM     39 yo presents with abdominal pain, CT abd/pelvis demonstrates pelvic fluid collection.  Imaging reviewed.  The pelvic fluid collection is not well organized, no encapsulated, and at his early stage not amenable to percutaneous drainage.  Recommend conservative rx, antibiotics, consider repeat CT in 2-3 days to assure resolution, or consider drainage at that time if collection matures into abscess.               6/11 HOSPITALIST NOTE  Chief Complaint: intra-abd abscess     SUBJECTIVE:     No acute events overnight.  Patient denies chest pain, sob.  No fevers/chills.  No n/v.  No worsening abd pain.       10 ROS completed and otherwise negative.     OBJECTIVE:  Vital signs in last 24 hours:  /70 (BP Location: Right arm)   Pulse 100   Temp 99.3 °F (37.4 °C) (Oral)   Resp 18   Ht 5' 1\" (1.549 m)   Wt 233 lb 9.6 oz (106 kg)   SpO2 93%   BMI 44.14 kg/m²      Intake/Output:     Intake/Output Summary (Last 24 hours) at 6/11/2024 1212  Last data filed at 6/11/2024 1100      Gross per 24 hour   Intake 840 ml   Output --   Net 840 ml        Labs:         Lab Results   Component Value Date     WBC 16.1 06/11/2024     HGB 12.7 06/11/2024     HCT 39.2 06/11/2024     .0 06/11/2024     CREATSERUM 0.74 06/11/2024     BUN 6 06/11/2024      06/11/2024     K 4.0 06/11/2024       06/11/2024     CO2 26.0 06/11/2024      06/11/2024     CA 8.7 06/11/2024     ALB 4.4 06/10/2024     ALKPHO 138 06/10/2024     BILT 0.8 06/10/2024     TP 7.5 06/10/2024     AST 26 06/10/2024     ALT 21 06/10/2024     LIP 48 06/10/2024           Assessment      Patient Active Problem List   Diagnosis    Psoriasis    Psoriatic arthritis (HCC)    Pre-diabetes    Glaucoma    Intra-abdominal abscess (HCC)    Leukocytosis, unspecified type         Plan:      Deep pelvic abscess  - visualized on ct abdomen  - started IV abx  - Gen Sx, IR and Ob/Gyn consulted  - seen by IR - not a candidate for IR drain at this time  - will cont IV abx for now and consider repeat CT abd/pelvis in 2-3 days  - encouraged activity as tolerated and IS     Morbid obesity   - BMI 44  - diet and exercise when more stable            DM type 2  - ISS  - accuchecks and adjust insulin as needed     HTN  - stable     HL  - fu as outpt     SARAH  - cont outpt follow up      Psoriatic Arthritis  - fu as outpt     Prophylaxis:   DVT with heparin     Dispo: pending clinical course     Global A/P  - reviewed labs and vitals from today  - reviewed notes of the day  - cbc, bmp, mag ordered for tomorrow  - discussed need to stay in the hospital today due to absce  - cont IV abx  - discussed with patient/RN and care team     MDM: high level, acute illness posing a threat to life, IV medication requiring close monitoring in hospital.     6/11 OB/GYNE NOTE    Assessment & Plan:  Intra-abdominal abscess (HCC)  Suspect spontaneous drainage.  Continue abx and plan repeat imaging in few days       Leukocytosis, unspecified type  improved            Plan discussed with patient who verbalizes understanding and agreement         Objective:  Vital signs in last 24 hours:  Temp:  [97.7 °F (36.5 °C)-99.3 °F (37.4 °C)] 98.9 °F (37.2 °C)  Pulse:  [] 96  Resp:  [18] 18  BP: (106-128)/(57-77) 109/57  SpO2:  [92 %-97 %] 92 %     Input/Output:     Intake/Output Summary  (Last 24 hours) at 6/11/2024 1616  Last data filed at 6/11/2024 1532      Gross per 24 hour   Intake 1340 ml   Output --   Net 1340 ml          Results:        Lab Results   Component Value Date     WBC 16.1 (H) 06/11/2024     HGB 12.7 06/11/2024     HCT 39.2 06/11/2024     .0 06/11/2024     CREATSERUM 0.74 06/11/2024     BUN 6 (L) 06/11/2024      06/11/2024     K 4.0 06/11/2024      06/11/2024     CO2 26.0 06/11/2024      (H) 06/11/2024     CA 8.7 06/11/2024

## 2024-06-13 ENCOUNTER — APPOINTMENT (OUTPATIENT)
Dept: CT IMAGING | Facility: HOSPITAL | Age: 39
DRG: 862 | End: 2024-06-13
Attending: SPECIALIST
Payer: MEDICAID

## 2024-06-13 LAB
BASOPHILS # BLD AUTO: 0.06 X10(3) UL (ref 0–0.2)
BASOPHILS NFR BLD AUTO: 0.7 %
DEPRECATED RDW RBC AUTO: 39.3 FL (ref 35.1–46.3)
EOSINOPHIL # BLD AUTO: 0.31 X10(3) UL (ref 0–0.7)
EOSINOPHIL NFR BLD AUTO: 3.7 %
ERYTHROCYTE [DISTWIDTH] IN BLOOD BY AUTOMATED COUNT: 11.2 % (ref 11–15)
GLUCOSE BLDC GLUCOMTR-MCNC: 105 MG/DL (ref 70–99)
GLUCOSE BLDC GLUCOMTR-MCNC: 109 MG/DL (ref 70–99)
GLUCOSE BLDC GLUCOMTR-MCNC: 171 MG/DL (ref 70–99)
HCT VFR BLD AUTO: 40.9 %
HGB BLD-MCNC: 13.6 G/DL
IMM GRANULOCYTES # BLD AUTO: 0.04 X10(3) UL (ref 0–1)
IMM GRANULOCYTES NFR BLD: 0.5 %
LYMPHOCYTES # BLD AUTO: 3.64 X10(3) UL (ref 1–4)
LYMPHOCYTES NFR BLD AUTO: 43.1 %
MCH RBC QN AUTO: 31.5 PG (ref 26–34)
MCHC RBC AUTO-ENTMCNC: 33.3 G/DL (ref 31–37)
MCV RBC AUTO: 94.7 FL
MONOCYTES # BLD AUTO: 0.46 X10(3) UL (ref 0.1–1)
MONOCYTES NFR BLD AUTO: 5.4 %
NEUTROPHILS # BLD AUTO: 3.94 X10 (3) UL (ref 1.5–7.7)
NEUTROPHILS # BLD AUTO: 3.94 X10(3) UL (ref 1.5–7.7)
NEUTROPHILS NFR BLD AUTO: 46.6 %
PLATELET # BLD AUTO: 315 10(3)UL (ref 150–450)
RBC # BLD AUTO: 4.32 X10(6)UL
WBC # BLD AUTO: 8.5 X10(3) UL (ref 4–11)

## 2024-06-13 PROCEDURE — 99233 SBSQ HOSP IP/OBS HIGH 50: CPT | Performed by: HOSPITALIST

## 2024-06-13 PROCEDURE — 74177 CT ABD & PELVIS W/CONTRAST: CPT | Performed by: SPECIALIST

## 2024-06-13 NOTE — PAYOR COMM NOTE
--------------  ADMISSION REVIEW     Payor: [facility]  Subscriber #:  [id number]  Authorization Number: [id number]    Admit date: 6/10/24  Admit time:  7:50 PM       REVIEW DOCUMENTATION:     ED Provider Notes        ED Provider Notes signed by Chang Royal MD at 6/10/2024  8:38 PM       Author: Chang Royal MD Service: -- Author Type: Physician    Filed: 6/10/2024  8:38 PM Date of Service: 6/10/2024  8:36 PM Status: Signed    : Chang Royal MD (Physician)           Patient Seen in: [facility] 1w    History     Chief Complaint   Patient presents with    Abdomen/Flank Pain     Stated Complaint: back and abdominal pain    HPI    Patient complains of bilateral lower abdominal pain that began couple days ago.  Reports 2 weeks ago she had intercourse for the first time since having hysterectomy a year ago.  She states it was extremely painful, had some vaginal bleeding afterward as well.  Was evaluated seen in outside ER.  States she got a little bit better but then over the past few days pain is returned and intensified..  Pain described as sharp aching.  Pain rated as 9/10.  Modifying factors include: none.          Past Medical History:    Anxiety    ANA II (cervical intraepithelial neoplasia II)    Depression    Glaucoma    PCOS (polycystic ovarian syndrome)    Pre-diabetes    Psoriasis    Psoriatic arthritis (HCC)       Past Surgical History:   Procedure Laterality Date    Tonsillectomy              Family History   Family history unknown: Yes       Social History     Socioeconomic History    Marital status: Single   Tobacco Use    Smoking status: Every Day    Smokeless tobacco: Never   Vaping Use    Vaping status: Never Used   Substance and Sexual Activity    Alcohol use: No     Alcohol/week: 0.0 standard drinks of alcohol    Drug use: Yes     Types: Cannabis     Social Determinants of Health     Financial Resource Strain: High Risk (2/28/2024)    Received from  [facility]    Overall Financial Resource Strain (CARDIA)     Difficulty of Paying Living Expenses: Very hard   Food Insecurity: Food Insecurity Present (2/28/2024)    Received from [facility]    Hunger Vital Sign     Worried About Running Out of Food in the Last Year: Often true     Ran Out of Food in the Last Year: Often true   Transportation Needs: Unmet Transportation Needs (2/28/2024)    Received from [facility]    PRAPARE - Transportation     Lack of Transportation (Medical): Yes     Lack of Transportation (Non-Medical): Yes   Housing Stability: High Risk (2/28/2024)    Received from [facility]    Housing Stability Vital Sign     Unable to Pay for Housing in the Last Year: Yes     In the last 12 months, was there a time when you did not have a steady place to sleep or slept in a shelter (including now)?: No       Review of Systems    Positive for stated complaint: back and abdominal pain  Other systems are as noted in HPI.  Constitutional and vital signs reviewed.      All other systems reviewed and negative except as noted above.    PSFH elements reviewed from today and agreed except as otherwise stated in HPI.    Physical Exam     ED Triage Vitals [06/10/24 1505]   /57   Pulse 113   Resp 18   Temp 98 °F (36.7 °C)   Temp src Oral   SpO2 98 %   O2 Device None (Room air)       Current:/77 (BP Location: Right arm)   Pulse 88   Temp 97.7 °F (36.5 °C) (Oral)   Resp 18   Ht 154.9 cm (5' 1\")   Wt 105.9 kg   SpO2 95%   BMI 44.10 kg/m²   Pulse ox nl        Physical Exam  General Appearance: alert, no distress  Eyes: pupils equal and round no pallor or injection  ENT, Mouth: mucous membranes moist  Respiratory: there are no retractions, lungs are clear to auscultation  Cardiovascular: regular rate and rhythm    Gastrointestinal: obese, soft tender blq and suprapubic region with vol guarding  Neurological: II-XII  grossly intact  no focal deficits  Skin: warm and dry, no rashes.  Musculoskeletal: neck is supple non tender        Extremities are symmetrical, full range of motion  Psychiatric: patient is pleasant, there is no agitation    DIFFERENTIAL DIAGNOSIS: After history and physical exam differential diagnosis was considered for diverticulitis versus cystitis versus PID          ED Course     Labs Reviewed   COMP METABOLIC PANEL (14) - Abnormal; Notable for the following components:       Result Value    Glucose 178 (*)     Alkaline Phosphatase 138 (*)     All other components within normal limits   CBC W/ DIFFERENTIAL - Abnormal; Notable for the following components:    WBC 19.9 (*)     Neutrophil Absolute Prelim 15.39 (*)     Neutrophil Absolute 15.39 (*)     Monocyte Absolute 1.02 (*)     All other components within normal limits   LIPASE - Normal   LACTIC ACID, PLASMA - Normal   CBC WITH DIFFERENTIAL WITH PLATELET    Narrative:     The following orders were created for panel order CBC With Differential With Platelet.  Procedure                               Abnormality         Status                     ---------                               -----------         ------                     CBC W/ DIFFERENTIAL[[id number]]          Abnormal            Final result                 Please view results for these tests on the individual orders.   BLOOD CULTURE   BLOOD CULTURE   AEROBIC BACTERIAL CULTURE   ANAEROBIC CULTURE       MDM         Cardiac Monitor:   Pulse Readings from Last 1 Encounters:   06/10/24 88   , sinus, 88  interpreted by me.    Radiology findings:  I personally reviewed the images. CT ABDOMEN+PELVIS(CONTRAST ONLY)(CPT=74177)    Result Date: 6/10/2024  CONCLUSION:   3.8 x 3.4 x 4.2 cm localized fluid collection in the midline of the lower pelvis inferior to the distal sigmoid colon and superior to the vaginal cuff with surrounding fat stranding.  This collection is concerning for an abscess.  The adjacent  rectosigmoid wall is mildly thickened with some adjacent fat stranding which could be reactive or reflect a nonspecific colitis.  No evidence of pneumoperitoneum or pneumatosis.    Dictated by (CST): Robert Arroyo MD on 6/10/2024 at 5:32 PM     Finalized by (CST): Robert Arroyo MD on 6/10/2024 at 5:42 PM                 Medical Decision Making  Problems Addressed:  Intra-abdominal abscess (HCC): acute illness or injury     Details: Unclear if this is related to recent traumatic intercourse and is at the vaginal cuff versus connected to the sigmoid colon.  Consulted surgery Dr. Ku and Dr. Malloy from OB.  Also spoke with IR Dr. Grayson Dave in the ER for admission IV antibiotics initiated.    Amount and/or Complexity of Data Reviewed  External Data Reviewed: notes.     Details: Reviewed encounter at [facility] for pelvic pain after intercourse.  Labs: ordered. Decision-making details documented in ED Course.  Radiology: ordered. Decision-making details documented in ED Course.    Risk  Decision regarding hospitalization.            Disposition and Plan     Clinical Impression:  1. Intra-abdominal abscess (HCC)    2. Leukocytosis, unspecified type        Disposition:  Admit    Follow-up:  No follow-up provider specified.    Medications Prescribed:  Current Discharge Medication List          Hospital Problems       Present on Admission  Date Reviewed: 2/24/2020            ICD-10-CM Noted POA    * (Principal) Intra-abdominal abscess (HCC) K65.1 6/10/2024 Unknown    Leukocytosis, unspecified type D72.829 6/10/2024 Unknown                  Signed by Chang Royal MD on 6/10/2024  8:38 PM         MEDICATIONS ADMINISTERED IN LAST 1 DAY:  HYDROcodone-acetaminophen (Norco) 7.5-325 MG per tab 1 tablet       Date Action Dose Route User    6/13/2024 1449 Given 1 tablet Oral Makenna Jon RN    6/13/2024 0623 Given 1 tablet Oral Karsten Lorenzo RN    6/12/2024 2018 Given 1 tablet Oral Karsten Lorenzo RN           iopamidol 76% (ISOVUE-370) injection for power injector       Date Action Dose Route User    6/13/2024 1221 Given 80 mL Intravenous Maribell, Clary          morphINE PF 4 MG/ML injection 2 mg       Date Action Dose Route User    6/12/2024 2258 Given 2 mg Intravenous Karsten Lorenzo RN          ondansetron (Zofran) 4 MG/2ML injection 4 mg       Date Action Dose Route User    6/13/2024 1045 Given 4 mg Intravenous Makenna Jon RN          piperacillin-tazobactam (Zosyn) 3.375 g in dextrose 5% 100 mL IVPB-ADDV       Date Action Dose Route User    6/13/2024 1450 New Bag 3.375 g Intravenous Makenna Jon RN    6/13/2024 0623 New Bag 3.375 g Intravenous Karsten Lorenzo RN    6/12/2024 2253 New Bag 3.375 g Intravenous Karsten Lorenzo RN            Vitals (last day)       Date/Time Temp Pulse Resp BP SpO2 Weight O2 Device O2 Flow Rate (L/min) [facility]    06/13/24 1515 97.5 °F (36.4 °C) 89 16 141/89 97 % -- None (Room air) --     06/13/24 0745 97.2 °F (36.2 °C) 75 16 120/82 97 % -- None (Room air) --     06/13/24 0346 97.2 °F (36.2 °C) 76 16 109/64 95 % -- None (Room air) --     06/12/24 1900 97.9 °F (36.6 °C) 83 18 125/80 96 % -- None (Room air) --     06/12/24 1447 97.6 °F (36.4 °C) 72 18 140/81 97 % -- None (Room air) --     06/12/24 0737 97.7 °F (36.5 °C) 86 18 130/71 97 % -- None (Room air) --     06/12/24 0609 97.8 °F (36.6 °C) 83 18 116/62 96 % -- None (Room air) --        6/12 HOSPITALIST NOTE    Assessment & Plan:  ----------------------------------  Pelvic abscess.  Ultimate etiology unclear though may be related to recent intercourse.  May have spontaneously drained on 6/11.  Noticed significant increase in discharge followed by relief of pain.  White count has normalized.  -Gynecology, general surgery, IR consult appreciated  -Reimaging in 1 to 2 days  -Continue Zosyn  -Pain control  -Increase activity     Other problems  Morbid obesity BMI 44  Type 2  diabetes  Hypertension  Dyslipidemia  SARAH  Psoriatic arthritis     dvt prophylaxis: sc heparin  code status: full code  dispo: home upon medical clearance       Subjective:  ----------------------------------  Low pelvic pain a lot better.  No further discharge after yesterday afternoon              Objective:  Chief Complaint:       Chief Complaint   Patient presents with    Abdomen/Flank Pain      ----------------------------------  Temp:  [97.7 °F (36.5 °C)-98.9 °F (37.2 °C)] 97.7 °F (36.5 °C)  Pulse:  [83-96] 86  Resp:  [18] 18  BP: (109-130)/(57-71) 130/71  SpO2:  [92 %-97 %] 97 %      Labs:        Lab Results   Component Value Date     HGB 12.9 06/12/2024     WBC 9.1 06/12/2024     .0 06/12/2024      06/12/2024     K 4.2 06/12/2024     CREATSERUM 0.68 06/12/2024     AST 26 06/10/2024     ALT 21 06/10/2024          Scheduled Medications    heparin  5,000 Units Subcutaneous Q8H MANNIE    piperacillin-tazobactam  3.375 g Intravenous Q8H    insulin aspart  1-7 Units Subcutaneous TID CC         6/12 GEN SURGERY NOTE    Subjective:  Feels better      Objective/Physical Exam:  General: Alert, orientated x3.  Cooperative.  No apparent distress.  Vital Signs:  Blood pressure 130/71, pulse 86, temperature 97.7 °F (36.5 °C), temperature source Oral, resp. rate 18, height 5' 1\" (1.549 m), weight 233 lb 9.6 oz (106 kg), SpO2 97%.  Abdomen:  Soft, non-distended, non-tender,         Assessment/Plan:      Patient Active Problem List   Diagnosis    Psoriasis    Psoriatic arthritis (HCC)    Pre-diabetes    Glaucoma    Intra-abdominal abscess (HCC)    Leukocytosis, unspecified type   9.0 wbc  Will repeat CT with oral contrast  to r/o bowel involvement  Etiology of drainage  ?        6/12 OB/GYN NOTE    Assessment/Plan:  38 year old G0 who presented with pelvic pain s/p TLH 10/2023, now with intra-abdominal infection on antibiotics (Zosyn day 3)  Feeling better. Plan for repeat CT with contrast tomorrow AM.     6/13 GEN  SURGERY NOTE    Subjective:  Some nausea from contrast  no further drainage     Objective/Physical Exam:  General: Alert, orientated x3.  Cooperative.  No apparent distress.  Vital Signs:  Blood pressure 120/82, pulse 75, temperature 97.2 °F (36.2 °C), temperature source Oral, resp. rate 16, height 5' 1\" (1.549 m), weight 233 lb 9.6 oz (106 kg), SpO2 97%.  Abdomen:  Soft, non-distended, vague tenderness lower midline  Labs:        Lab Results   Component Value Date     WBC 8.5 06/13/2024     HGB 13.6 06/13/2024     HCT 40.9 06/13/2024     .0 06/13/2024          Assessment/Plan:      Patient Active Problem List   Diagnosis    Psoriasis    Psoriatic arthritis (HCC)    Pre-diabetes    Glaucoma    Intra-abdominal abscess (HCC)    Leukocytosis, unspecified type   8.5 wbc  Await repeat CT    continue antibiotics      OB/GYN NOTE        Subjective:  Linda Unger is a(n) 38 year old female   Pt curretlly reports a little nausea but believes it is do to the medicine she is given.  She denies emesis, CP,SOB, F/C.  She reports slight tenderness on the left lower side.       She states she is mixing up her dates but yesterday or the day before when she was walking to the bathroom she noticed water warm fluid come out.  She is hoping the abscess is less after that.           Objective:  Vital Signs:  Blood pressure 120/82, pulse 75, temperature 97.2 °F (36.2 °C), temperature source Oral, resp. rate 16, height 5' 1\" (1.549 m), weight 233 lb 9.6 oz (106 kg), SpO2 97%.         Assessment & Plan:  Intra-abdominal abscess (HCC)  Pt has a h/o Total hysterectomy with BSO 8 months ago.  Occurrences now unlikely due to surgery from 8 months ago.  General surgery and IR are on consult.    Currently on Abx (Zosyn)  Plan for today is to repeat a CT  If it is smaller it may have reabsorbed and if there is question about drainage through the vagina, then the possibility of a fistula can be suspected.  Then the question to radiology  would be what imaging study can be done to see if there is a fistula      Leukocytosis, unspecified type  WBC normal today     Since patient no longer has gyne structures, abscess unlikely due to surgery 8 months ago, and if surgery should be needed we would refer to the surgical team, we will follow peripherally.   If anything is needed feel free to call.  I spoke to  Dr. Grande to inform.     6/13 HOSPITALIST NOTE    Assessment & Plan:  ----------------------------------  Pelvic abscess.  Ultimate etiology unclear though may be related to recent intercourse.  May have spontaneously drained on 6/11.  Noticed significant increase in discharge followed by relief of pain.  White count has normalized.  No clear evidence of diverticulitis.  Fistula cannot be completely ruled out though seems less likely.  Repeat CT 6/13 shows improvement, small residual fluid collection.  -Gynecology, general surgery, IR consult appreciated  -ID consult  -Continue Zosyn  -Pain control  -Increase activity     Other problems  Morbid obesity BMI 44  Type 2 diabetes  Hypertension  Dyslipidemia  SARAH  Psoriatic arthritis      Subjective:  ----------------------------------  Low pelvic pain a lot better.  No further discharge after yesterday afternoon              Objective:  Chief Complaint:       Chief Complaint   Patient presents with    Abdomen/Flank Pain      ----------------------------------  Temp:  [97.2 °F (36.2 °C)-97.9 °F (36.6 °C)] 97.2 °F (36.2 °C)  Pulse:  [72-83] 75  Resp:  [16-18] 16  BP: (109-140)/(64-82) 120/82  SpO2:  [95 %-97 %] 97 %    6/13 ID CONSULT NOTE       Reason for Consultation:  Pelvic abscess     ASSESSMENT:     Antibiotics: IV zosyn     # Pelvic abscess with previous MEAGHAN/BSO 10/2023 with vaginal discharge; not well organized. Seems not related to sigmoid diverticulitis - unclear etiology - cx with C. glabrata               - improved on repeat CT A/P 6/13  # Psoriatic arthritis on Tremfya  # Immunocompromised  host        PLAN:     -  IV zosyn  -  start IV micafungin  -  check pelvic US  -  f/up cx  -  Follow fever curve, wbc  -  Reviewed labs, micro, imaging reports, available old records  -  d/w patient, family, RN, Primary      History of Present Illness:  Linda Unger is a a(n) 38 year old female. Patient is a 38-year-old female with a history of depression, PCOS, morbid obesity, psoriatic arthritis on Stelara who had a total laparoscopic hysterectomy in 2023 with stage I ovarian and cervical cancer.  Now comes to the hospital on 6/10 with worsening lower abdominal pain especially with bowel movements and noted purulent discharge from the vagina. Also with constipation at home, dysuria, frequency. Denies fevers, chills.  Tmax 99.3.  Initial WBC 19.9 which is resolved.  Has been on IV Zosyn.  CT A/P on 6/10 with a 3.8 x 3.4 x 4.2 cm localized fluid collection in the midline of the lower pelvis inferior to the sigmoid colon and superior to the vaginal cuff with associated fat stranding concerning for an abscess repeat imaging in 6/13 with decreased fluid collection and fat stranding.  Seen by IR and noted fluid is not well organized and not able to be drained.

## 2024-06-13 NOTE — CONSULTS
[facility]  part of [facility] ID CONSULT NOTE    Linda Unger Patient Status:  Inpatient    1985 MRN [id number]   Location [facility] 1W Attending Patric Grande MD   Hosp Day # 3 PCP ALEXEY MANZANARES       Reason for Consultation:  Pelvic abscess    ASSESSMENT:    Antibiotics: IV zosyn    # Pelvic abscess with previous MEAGHAN/BSO 10/2023 with vaginal discharge; not well organized. Seems not related to sigmoid diverticulitis - unclear etiology - cx with C. glabrata   - improved on repeat CT A/P   # Psoriatic arthritis on Tremfya  # Immunocompromised host      PLAN:    -  IV zosyn  -  start IV micafungin  -  check pelvic US  -  f/up cx  -  Follow fever curve, wbc  -  Reviewed labs, micro, imaging reports, available old records  -  d/w patient, family, RN, Primary    History of Present Illness:  Linda Unger is a a(n) 38 year old female. Patient is a 38-year-old female with a history of depression, PCOS, morbid obesity, psoriatic arthritis on Stelara who had a total laparoscopic hysterectomy in  with stage I ovarian and cervical cancer.  Now comes to the hospital on 6/10 with worsening lower abdominal pain especially with bowel movements and noted purulent discharge from the vagina. Also with constipation at home, dysuria, frequency. Denies fevers, chills.  Tmax 99.3.  Initial WBC 19.9 which is resolved.  Has been on IV Zosyn.  CT A/P on 6/10 with a 3.8 x 3.4 x 4.2 cm localized fluid collection in the midline of the lower pelvis inferior to the sigmoid colon and superior to the vaginal cuff with associated fat stranding concerning for an abscess repeat imaging in  with decreased fluid collection and fat stranding.  Seen by IR and noted fluid is not well organized and not able to be drained.    History:  Past Medical History:    Anxiety    ANA II (cervical intraepithelial neoplasia II)    Depression    Glaucoma    PCOS (polycystic ovarian syndrome)     Pre-diabetes    Psoriasis    Psoriatic arthritis (HCC)     Past Surgical History:   Procedure Laterality Date    Tonsillectomy       Family History   Family history unknown: Yes      reports that she has been smoking. She has never used smokeless tobacco. She reports current drug use. Drug: Cannabis. She reports that she does not drink alcohol.    Allergies:  No Known Allergies    Medications:    Current Facility-Administered Medications:     acetaminophen (Tylenol Extra Strength) tab 500 mg, 500 mg, Oral, Q4H PRN    HYDROcodone-acetaminophen (Norco) 7.5-325 MG per tab 1 tablet, 1 tablet, Oral, Q6H PRN    heparin (Porcine) 5000 UNIT/ML injection 5,000 Units, 5,000 Units, Subcutaneous, Q8H MANNIE    ondansetron (Zofran) 4 MG/2ML injection 4 mg, 4 mg, Intravenous, Q6H PRN    prochlorperazine (Compazine) 10 MG/2ML injection 5 mg, 5 mg, Intravenous, Q8H PRN    morphINE PF 4 MG/ML injection 1 mg, 1 mg, Intravenous, Q2H PRN **OR** morphINE PF 4 MG/ML injection 2 mg, 2 mg, Intravenous, Q2H PRN **OR** morphINE PF 4 MG/ML injection 4 mg, 4 mg, Intravenous, Q2H PRN    temazepam (Restoril) cap 15 mg, 15 mg, Oral, Nightly PRN    piperacillin-tazobactam (Zosyn) 3.375 g in dextrose 5% 100 mL IVPB-ADDV, 3.375 g, Intravenous, Q8H    glucose (Dex4) 15 GM/59ML oral liquid 15 g, 15 g, Oral, Q15 Min PRN **OR** glucose (Glutose) 40% oral gel 15 g, 15 g, Oral, Q15 Min PRN **OR** glucose-vitamin C (Dex-4) chewable tab 4 tablet, 4 tablet, Oral, Q15 Min PRN **OR** dextrose 50% injection 50 mL, 50 mL, Intravenous, Q15 Min PRN **OR** glucose (Dex4) 15 GM/59ML oral liquid 30 g, 30 g, Oral, Q15 Min PRN **OR** glucose (Glutose) 40% oral gel 30 g, 30 g, Oral, Q15 Min PRN **OR** glucose-vitamin C (Dex-4) chewable tab 8 tablet, 8 tablet, Oral, Q15 Min PRN    insulin aspart (NovoLOG) 100 Units/mL FlexPen 1-7 Units, 1-7 Units, Subcutaneous, TID CC    Review of Systems:  Per HPI    Physical Exam:  Vital signs: Blood pressure 141/89, pulse 89, temperature  97.5 °F (36.4 °C), temperature source Oral, resp. rate 16, height 154.9 cm (5' 1\"), weight 233 lb 9.6 oz (106 kg), SpO2 97%.    General: Alert, oriented, NAD  HEENT: Moist mucous membranes. EOMI  Neck: No lymphadenopathy.  Supple.  Cardiovascular: No chest wall tenderness  Respiratory: Symmetric expansion  Abdomen: Soft, nontender, nondistended.   Musculoskeletal: No edema noted  Integument: No lesions. No erythema.    Laboratory Data: Reviewed in EMR    Microbiology: Reviewed in EMR    Radiology: Reviewed    Thank you for allowing us to participate in the care of this patient. Please do not hesitate to call if you have any questions.     We will continue to follow with you and will make further recommendations based on his progress.    David Hernandez MD   [facility]  [phone]  6/13/2024

## 2024-06-13 NOTE — PAYOR COMM NOTE
6/12 & 6/13    CONTINUED STAY REVIEW    Payor: [facility]  Subscriber #:  [id number]  Authorization Number: [id number]    Admit date: 6/10/24  Admit time:  7:50 PM            MEDICATIONS ADMINISTERED IN LAST 1 DAY:  HYDROcodone-acetaminophen (Norco) 7.5-325 MG per tab 1 tablet       Date Action Dose Route User    6/13/2024 1449 Given 1 tablet Oral Makenna Jon RN    6/13/2024 0623 Given 1 tablet Oral Karsten Lorenzo RN    6/12/2024 2018 Given 1 tablet Oral Karsten Lorenzo RN          iopamidol 76% (ISOVUE-370) injection for power injector       Date Action Dose Route User    6/13/2024 1221 Given 80 mL Intravenous Clary Reyna          morphINE PF 4 MG/ML injection 2 mg       Date Action Dose Route User    6/12/2024 2258 Given 2 mg Intravenous Karsten Lorenzo RN          ondansetron (Zofran) 4 MG/2ML injection 4 mg       Date Action Dose Route User    6/13/2024 1045 Given 4 mg Intravenous Makenna Jon RN          piperacillin-tazobactam (Zosyn) 3.375 g in dextrose 5% 100 mL IVPB-ADDV       Date Action Dose Route User    6/13/2024 1450 New Bag 3.375 g Intravenous Makenna Jon RN    6/13/2024 0623 New Bag 3.375 g Intravenous Karsten Lorenzo RN    6/12/2024 2253 New Bag 3.375 g Intravenous Karsten Lorenzo RN            Vitals (last day)       Date/Time Temp Pulse Resp BP SpO2 Weight O2 Device O2 Flow Rate (L/min) [facility]    06/13/24 1515 97.5 °F (36.4 °C) 89 16 141/89 97 % -- None (Room air) --     06/13/24 0745 97.2 °F (36.2 °C) 75 16 120/82 97 % -- None (Room air) --     06/13/24 0346 97.2 °F (36.2 °C) 76 16 109/64 95 % -- None (Room air) --     06/12/24 1900 97.9 °F (36.6 °C) 83 18 125/80 96 % -- None (Room air) -- GV    06/12/24 1447 97.6 °F (36.4 °C) 72 18 140/81 97 % -- None (Room air) -- EL    06/12/24 0737 97.7 °F (36.5 °C) 86 18 130/71 97 % -- None (Room air) -- EL    06/12/24 0609 97.8 °F (36.6 °C) 83 18 116/62 96 % -- None (Room air) -- AC            6/12 HOSPITALIST NOTE    Assessment & Plan:  ----------------------------------  Pelvic abscess.  Ultimate etiology unclear though may be related to recent intercourse.  May have spontaneously drained on 6/11.  Noticed significant increase in discharge followed by relief of pain.  White count has normalized.  -Gynecology, general surgery, IR consult appreciated  -Reimaging in 1 to 2 days  -Continue Zosyn  -Pain control  -Increase activity     Other problems  Morbid obesity BMI 44  Type 2 diabetes  Hypertension  Dyslipidemia  SARAH  Psoriatic arthritis     dvt prophylaxis: sc heparin  code status: full code  dispo: home upon medical clearance       Subjective:  ----------------------------------  Low pelvic pain a lot better.  No further discharge after yesterday afternoon              Objective:  Chief Complaint:       Chief Complaint   Patient presents with    Abdomen/Flank Pain      ----------------------------------  Temp:  [97.7 °F (36.5 °C)-98.9 °F (37.2 °C)] 97.7 °F (36.5 °C)  Pulse:  [83-96] 86  Resp:  [18] 18  BP: (109-130)/(57-71) 130/71  SpO2:  [92 %-97 %] 97 %      Labs:        Lab Results   Component Value Date     HGB 12.9 06/12/2024     WBC 9.1 06/12/2024     .0 06/12/2024      06/12/2024     K 4.2 06/12/2024     CREATSERUM 0.68 06/12/2024     AST 26 06/10/2024     ALT 21 06/10/2024          Scheduled Medications    heparin  5,000 Units Subcutaneous Q8H MANNIE    piperacillin-tazobactam  3.375 g Intravenous Q8H    insulin aspart  1-7 Units Subcutaneous TID CC         6/12 GEN SURGERY NOTE    Subjective:  Feels better      Objective/Physical Exam:  General: Alert, orientated x3.  Cooperative.  No apparent distress.  Vital Signs:  Blood pressure 130/71, pulse 86, temperature 97.7 °F (36.5 °C), temperature source Oral, resp. rate 18, height 5' 1\" (1.549 m), weight 233 lb 9.6 oz (106 kg), SpO2 97%.  Abdomen:  Soft, non-distended, non-tender,         Assessment/Plan:      Patient Active  Problem List   Diagnosis    Psoriasis    Psoriatic arthritis (HCC)    Pre-diabetes    Glaucoma    Intra-abdominal abscess (HCC)    Leukocytosis, unspecified type   9.0 wbc  Will repeat CT with oral contrast  to r/o bowel involvement  Etiology of drainage  ?        6/12 OB/GYN NOTE    Assessment/Plan:  38 year old G0 who presented with pelvic pain s/p [facility] 10/2023, now with intra-abdominal infection on antibiotics (Zosyn day 3)  Feeling better. Plan for repeat CT with contrast tomorrow AM.     6/13 GEN SURGERY NOTE    Subjective:  Some nausea from contrast  no further drainage     Objective/Physical Exam:  General: Alert, orientated x3.  Cooperative.  No apparent distress.  Vital Signs:  Blood pressure 120/82, pulse 75, temperature 97.2 °F (36.2 °C), temperature source Oral, resp. rate 16, height 5' 1\" (1.549 m), weight 233 lb 9.6 oz (106 kg), SpO2 97%.  Abdomen:  Soft, non-distended, vague tenderness lower midline  Labs:        Lab Results   Component Value Date     WBC 8.5 06/13/2024     HGB 13.6 06/13/2024     HCT 40.9 06/13/2024     .0 06/13/2024          Assessment/Plan:      Patient Active Problem List   Diagnosis    Psoriasis    Psoriatic arthritis (HCC)    Pre-diabetes    Glaucoma    Intra-abdominal abscess (HCC)    Leukocytosis, unspecified type   8.5 wbc  Await repeat CT    continue antibiotics      OB/GYN NOTE        Subjective:  Linda Unger is a(n) 38 year old female   Pt curretlly reports a little nausea but believes it is do to the medicine she is given.  She denies emesis, CP,SOB, F/C.  She reports slight tenderness on the left lower side.       She states she is mixing up her dates but yesterday or the day before when she was walking to the bathroom she noticed water warm fluid come out.  She is hoping the abscess is less after that.           Objective:  Vital Signs:  Blood pressure 120/82, pulse 75, temperature 97.2 °F (36.2 °C), temperature source Oral, resp. rate 16, height 5' 1\" (1.549  m), weight 233 lb 9.6 oz (106 kg), SpO2 97%.         Assessment & Plan:  Intra-abdominal abscess (HCC)  Pt has a h/o Total hysterectomy with BSO 8 months ago.  Occurrences now unlikely due to surgery from 8 months ago.  General surgery and IR are on consult.    Currently on Abx (Zosyn)  Plan for today is to repeat a CT  If it is smaller it may have reabsorbed and if there is question about drainage through the vagina, then the possibility of a fistula can be suspected.  Then the question to radiology would be what imaging study can be done to see if there is a fistula      Leukocytosis, unspecified type  WBC normal today     Since patient no longer has gyne structures, abscess unlikely due to surgery 8 months ago, and if surgery should be needed we would refer to the surgical team, we will follow peripherally.   If anything is needed feel free to call.  I spoke to  Dr. Grande to inform.     6/13 HOSPITALIST NOTE    Assessment & Plan:  ----------------------------------  Pelvic abscess.  Ultimate etiology unclear though may be related to recent intercourse.  May have spontaneously drained on 6/11.  Noticed significant increase in discharge followed by relief of pain.  White count has normalized.  No clear evidence of diverticulitis.  Fistula cannot be completely ruled out though seems less likely.  Repeat CT 6/13 shows improvement, small residual fluid collection.  -Gynecology, general surgery, IR consult appreciated  -ID consult  -Continue Zosyn  -Pain control  -Increase activity     Other problems  Morbid obesity BMI 44  Type 2 diabetes  Hypertension  Dyslipidemia  SARAH  Psoriatic arthritis      Subjective:  ----------------------------------  Low pelvic pain a lot better.  No further discharge after yesterday afternoon              Objective:  Chief Complaint:       Chief Complaint   Patient presents with    Abdomen/Flank Pain      ----------------------------------  Temp:  [97.2 °F (36.2 °C)-97.9 °F (36.6 °C)] 97.2  °F (36.2 °C)  Pulse:  [72-83] 75  Resp:  [16-18] 16  BP: (109-140)/(64-82) 120/82  SpO2:  [95 %-97 %] 97 %    6/13 ID CONSULT NOTE       Reason for Consultation:  Pelvic abscess     ASSESSMENT:     Antibiotics: IV zosyn     # Pelvic abscess with previous MEAGHAN/BSO 10/2023 with vaginal discharge; not well organized. Seems not related to sigmoid diverticulitis - unclear etiology - cx with C. glabrata               - improved on repeat CT A/P 6/13  # Psoriatic arthritis on Tremfya  # Immunocompromised host        PLAN:     -  IV zosyn  -  start IV micafungin  -  check pelvic US  -  f/up cx  -  Follow fever curve, wbc  -  Reviewed labs, micro, imaging reports, available old records  -  d/w patient, family, RN, Primary      History of Present Illness:  Linda Unger is a a(n) 38 year old female. Patient is a 38-year-old female with a history of depression, PCOS, morbid obesity, psoriatic arthritis on Stelara who had a total laparoscopic hysterectomy in 2023 with stage I ovarian and cervical cancer.  Now comes to the hospital on 6/10 with worsening lower abdominal pain especially with bowel movements and noted purulent discharge from the vagina. Also with constipation at home, dysuria, frequency. Denies fevers, chills.  Tmax 99.3.  Initial WBC 19.9 which is resolved.  Has been on IV Zosyn.  CT A/P on 6/10 with a 3.8 x 3.4 x 4.2 cm localized fluid collection in the midline of the lower pelvis inferior to the sigmoid colon and superior to the vaginal cuff with associated fat stranding concerning for an abscess repeat imaging in 6/13 with decreased fluid collection and fat stranding.  Seen by IR and noted fluid is not well organized and not able to be drained.

## 2024-06-13 NOTE — PLAN OF CARE
Problem: GASTROINTESTINAL - ADULT  Goal: Maintains or returns to baseline bowel function  Description: INTERVENTIONS:  - Assess bowel function  - Maintain adequate hydration with IV or PO as ordered and tolerated  - Evaluate effectiveness of GI medications  - Encourage mobilization and activity  - Obtain nutritional consult as needed  - Establish a toileting routine/schedule  - Consider collaborating with pharmacy to review patient's medication profile  Outcome: Progressing     Problem: METABOLIC/FLUID AND ELECTROLYTES - ADULT  Goal: Glucose maintained within prescribed range  Description: INTERVENTIONS:  - Monitor Blood Glucose as ordered  - Assess for signs and symptoms of hyperglycemia and hypoglycemia  - Administer ordered medications to maintain glucose within target range  - Assess barriers to adequate nutritional intake and initiate nutrition consult as needed  - Instruct patient on self management of diabetes  Outcome: Progressing     Problem: PAIN - ADULT  Goal: Verbalizes/displays adequate comfort level or patient's stated pain goal  Description: INTERVENTIONS:  - Encourage pt to monitor pain and request assistance  - Assess pain using appropriate pain scale  - Administer analgesics based on type and severity of pain and evaluate response  - Implement non-pharmacological measures as appropriate and evaluate response  - Consider cultural and social influences on pain and pain management  - Manage/alleviate anxiety  - Utilize distraction and/or relaxation techniques  - Monitor for opioid side effects  - Notify MD/LIP if interventions unsuccessful or patient reports new pain  - Anticipate increased pain with activity and pre-medicate as appropriate  Outcome: Progressing     Problem: SAFETY ADULT - FALL  Goal: Free from fall injury  Description: INTERVENTIONS:  - Assess pt frequently for physical needs  - Identify cognitive and physical deficits and behaviors that affect risk of falls.  - Campus fall  precautions as indicated by assessment.  - Educate pt/family on patient safety including physical limitations  - Instruct pt to call for assistance with activity based on assessment  - Modify environment to reduce risk of injury  - Provide assistive devices as appropriate  - Consider OT/PT consult to assist with strengthening/mobility  - Encourage toileting schedule  Outcome: Progressing     Problem: Diabetes/Glucose Control  Goal: Glucose maintained within prescribed range  Description: INTERVENTIONS:  - Monitor Blood Glucose as ordered  - Assess for signs and symptoms of hyperglycemia and hypoglycemia  - Administer ordered medications to maintain glucose within target range  - Assess barriers to adequate nutritional intake and initiate nutrition consult as needed  - Instruct patient on self management of diabetes  Outcome: Progressing

## 2024-06-13 NOTE — PROGRESS NOTES
[facility]  part of [facility]    Progress Note    Linda Unger Patient Status:  Inpatient    1985 MRN [id number]   Location [facility] 1W Attending Patric Grande MD   Hosp Day # 3 PCP ALEXEY MANZANARES                Subjective:   Linda Unger is a(n) 38 year old female   Pt curretlly reports a little nausea but believes it is do to the medicine she is given.  She denies emesis, CP,SOB, F/C.  She reports slight tenderness on the left lower side.      She states she is mixing up her dates but yesterday or the day before when she was walking to the bathroom she noticed water warm fluid come out.  She is hoping the abscess is less after that.      Objective:   Vital Signs:  Blood pressure 120/82, pulse 75, temperature 97.2 °F (36.2 °C), temperature source Oral, resp. rate 16, height 5' 1\" (1.549 m), weight 233 lb 9.6 oz (106 kg), SpO2 97%.     General:  No acute distress. Alert and oriented x 3.  Sitting up in bed and after exam ambulated to the bathroom  Respiratory:  Clear to auscultation bilaterally.    Cardiovascular:  S1, S2.  Regular rate and rhythm.  Abdomen:  Soft, nontender, minimal tenderness on deep palpation in the LLQ.    Neurologic:  No focal neurological deficits.  Musculoskeletal:  Full range of motion of all extremities.  No swelling noted.  Integument:  No lesions.  No erythema.  Psychiatric:  Appropriate mood and affect.                Results:   Lab Results   Component Value Date    WBC 8.5 2024    HGB 13.6 2024    HCT 40.9 2024    .0 2024    CREATSERUM 0.68 2024    BUN <5 (L) 2024     2024    K 4.2 2024     2024    CO2 29.0 2024     (H) 2024    CA 9.3 2024    ALB 4.4 06/10/2024    ALKPHO 138 (H) 06/10/2024    BILT 0.8 06/10/2024    TP 7.5 06/10/2024    AST 26 06/10/2024    ALT 21 06/10/2024    LIP 48 06/10/2024    ESRML 5 2020    CRP 0.30 2020     MG 2.0 06/12/2024       No results found.        Assessment & Plan:     Intra-abdominal abscess (HCC)  Pt has a h/o Total hysterectomy with BSO 8 months ago.  Occurrences now unlikely due to surgery from 8 months ago.  General surgery and IR are on consult.    Currently on Abx (Zosyn)  Plan for today is to repeat a CT  If it is smaller it may have reabsorbed and if there is question about drainage through the vagina, then the possibility of a fistula can be suspected.  Then the question to radiology would be what imaging study can be done to see if there is a fistula      Leukocytosis, unspecified type  WBC normal today    Since patient no longer has gyne structures, abscess unlikely due to surgery 8 months ago, and if surgery should be needed we would refer to the surgical team, we will follow peripherally.   If anything is needed feel free to call.  I spoke to  Dr. Grande to inform.      Leesa Lockett MD  6/13/2024    Supplementary Documentation:

## 2024-06-13 NOTE — PROGRESS NOTES
[facility]  part of [facility]    Progress Note    Linda Unger Patient Status:  Inpatient    1985 MRN [id number]   Location [facility] 1W Attending Patric Grande MD   Hosp Day # 3 PCP ALEXEY MANZANARES     Subjective:  Some nausea from contrast  no further drainage    Objective/Physical Exam:  General: Alert, orientated x3.  Cooperative.  No apparent distress.  Vital Signs:  Blood pressure 120/82, pulse 75, temperature 97.2 °F (36.2 °C), temperature source Oral, resp. rate 16, height 5' 1\" (1.549 m), weight 233 lb 9.6 oz (106 kg), SpO2 97%.  Abdomen:  Soft, non-distended, vague tenderness lower midline  Labs:  Lab Results   Component Value Date    WBC 8.5 2024    HGB 13.6 2024    HCT 40.9 2024    .0 2024    CREATSERUM 0.68 2024    BUN <5 (L) 2024     2024    K 4.2 2024     2024    CO2 29.0 2024     (H) 2024    CA 9.3 2024    ALB 4.4 06/10/2024    ALKPHO 138 (H) 06/10/2024    BILT 0.8 06/10/2024    TP 7.5 06/10/2024    AST 26 06/10/2024    ALT 21 06/10/2024    LIP 48 06/10/2024    ESRML 5 2020    CRP 0.30 2020    MG 2.0 2024       Imaging:  CT ABDOMEN+PELVIS(CONTRAST ONLY)(CPT=74177)    Result Date: 6/10/2024  PROCEDURE: CT ABDOMEN + PELVIS (CONTRAST ONLY) (CPT=74177)  COMPARISON: None.  INDICATIONS: back and abdominal pain  TECHNIQUE: CT images of the abdomen and pelvis were obtained with non-ionic intravenous contrast material.  Automated exposure control for dose reduction was used. Adjustment of the mA and/or kV was done based on the patient's size. Use of iterative reconstruction technique for dose reduction was used.  Dose information is transmitted to the ACR (American College of Radiology) NRDR (National Radiology Data Registry) which includes the Dose Index Registry.  FINDINGS:  LUNG BASES: No focal consolidation. LIVER: Probable steatosis. SPLEEN: No  enlargement or focal lesion.  GALLBLADDER: No cholelithiasis. PANCREAS: No lesion, fluid collection, ductal dilatation, or atrophy.  ADRENALS: No defined mass or abnormal enlargement.  KIDNEYS: Enhance symmetrically without hydronephrosis. AORTA/VASCULAR:   Abdominal aorta is normal in caliber. ABDOMINAL NODES: No mass or adenopathy.  BOWEL/MESENTERY:  No dilated loops of bowel.  The appendix is unremarkable.  Just caudal to the distal sigmoid, there is an ill-defined hypodensity measuring 3.8 x 3.4 x 4.2 cm there is fat stranding surrounding this hypodense structure concerning for fluid collection/abscess.  No definite air seen within this localized fluid collection.  This fluid collection appears to be some located above the level of the vaginal cuff.  The adjacent distal sigmoid colon and wall appears mildly thickened and there is some mild adjacent fat stranding.  There is no evidence of pneumatosis or pneumoperitoneum. ABDOMINAL WALL: Unremarkable. URINARY BLADDER: No focal wall thickening or calculus.  PELVIC NODES: No adenopathy.   PELVIC ORGANS: Surgically absent uterus. BONES:   Spondylosis lower thoracic and lumbar spine. OTHER: Negative.          CONCLUSION:   3.8 x 3.4 x 4.2 cm localized fluid collection in the midline of the lower pelvis inferior to the distal sigmoid colon and superior to the vaginal cuff with surrounding fat stranding.  This collection is concerning for an abscess.  The adjacent rectosigmoid wall is mildly thickened with some adjacent fat stranding which could be reactive or reflect a nonspecific colitis.  No evidence of pneumoperitoneum or pneumatosis.    Dictated by (CST): Robert Arroyo MD on 6/10/2024 at 5:32 PM     Finalized by (CST): Robert Arroyo MD on 6/10/2024 at 5:42 PM            Assessment/Plan:  Patient Active Problem List   Diagnosis    Psoriasis    Psoriatic arthritis (HCC)    Pre-diabetes    Glaucoma    Intra-abdominal abscess (HCC)    Leukocytosis, unspecified  type   8.5 wbc  Await repeat CT    Obs   continue antibiotics      REINA MEDINA MD  6/13/2024  9:31 AM

## 2024-06-13 NOTE — PROGRESS NOTES
[facility]  part of [facility]  Hospitalist Progress Note     Linda Unger Patient Status:  Inpatient    1985  38 year old CSN [id number]   Location [facility]-A Attending Patric Grande MD   Hosp Day # 3 PCP ALEXEY MANZANARES     Assessment & Plan:   ----------------------------------  Pelvic abscess.  Ultimate etiology unclear though may be related to recent intercourse.  May have spontaneously drained on .  Noticed significant increase in discharge followed by relief of pain.  White count has normalized.  No clear evidence of diverticulitis.  Fistula cannot be completely ruled out though seems less likely.  Repeat CT  shows improvement, small residual fluid collection.  -Gynecology, general surgery, IR consult appreciated  -ID consult  -Continue Zosyn  -Pain control  -Increase activity    Other problems  Morbid obesity BMI 44  Type 2 diabetes  Hypertension  Dyslipidemia  SARAH  Psoriatic arthritis    dvt prophylaxis: sc heparin  code status: full code  dispo: home upon medical clearance    I personally reviewed the available laboratories, imaging including CT. I discussed/will discuss the case with surgery. I ordered laboratories, studies including CBC. I adjusted medications including Zosyn. Medical decision making high, risk is high.    >55min spent, >50% spent counseling and coordinating care in the form of educating pt/family and d/w consultants and staff. Most of the time spent discussing the above plan.       Subjective:   ----------------------------------  Low pelvic pain a lot better.  No further discharge after yesterday afternoon      Objective:   Chief Complaint:   Chief Complaint   Patient presents with    Abdomen/Flank Pain     ----------------------------------  Temp:  [97.2 °F (36.2 °C)-97.9 °F (36.6 °C)] 97.2 °F (36.2 °C)  Pulse:  [72-83] 75  Resp:  [16-18] 16  BP: (109-140)/(64-82) 120/82  SpO2:  [95 %-97 %] 97 %  Gen: A+Ox3.  No distress.   HEENT: NCAT, neck  supple, no carotid bruit.  CV: RRR, S1S2, and intact distal pulses. No gallop, rub, murmur.  Pulm: Effort and breath sounds normal. No distress, wheezes, rales, rhonchi.  Abd: Soft, NTND, BS normal, no mass, no HSM, no rebound/guarding.   Neuro: Normal reflexes, CN. Sensory/motor exams grossly normal deficit.   MS: No joint effusions.  No peripheral edema.  Skin: Skin is warm and dry. No rashes, erythema, diaphoresis.   Psych: Normal mood and affect. Calm, cooperative    Labs:  Lab Results   Component Value Date    HGB 13.6 06/13/2024    WBC 8.5 06/13/2024    .0 06/13/2024     06/12/2024    K 4.2 06/12/2024    CREATSERUM 0.68 06/12/2024    AST 26 06/10/2024    ALT 21 06/10/2024          heparin  5,000 Units Subcutaneous Q8H MANNIE    piperacillin-tazobactam  3.375 g Intravenous Q8H    insulin aspart  1-7 Units Subcutaneous TID CC       acetaminophen    HYDROcodone-acetaminophen    ondansetron    prochlorperazine    morphINE **OR** morphINE **OR** morphINE    temazepam    glucose **OR** glucose **OR** glucose-vitamin C **OR** dextrose **OR** glucose **OR** glucose **OR** glucose-vitamin C

## 2024-06-14 ENCOUNTER — APPOINTMENT (OUTPATIENT)
Dept: ULTRASOUND IMAGING | Facility: HOSPITAL | Age: 39
DRG: 862 | End: 2024-06-14
Attending: INTERNAL MEDICINE
Payer: MEDICAID

## 2024-06-14 VITALS
TEMPERATURE: 97 F | BODY MASS INDEX: 44.11 KG/M2 | SYSTOLIC BLOOD PRESSURE: 100 MMHG | DIASTOLIC BLOOD PRESSURE: 61 MMHG | HEIGHT: 61 IN | RESPIRATION RATE: 18 BRPM | OXYGEN SATURATION: 94 % | WEIGHT: 233.63 LBS | HEART RATE: 78 BPM

## 2024-06-14 LAB — GLUCOSE BLDC GLUCOMTR-MCNC: 150 MG/DL (ref 70–99)

## 2024-06-14 PROCEDURE — 99239 HOSP IP/OBS DSCHRG MGMT >30: CPT | Performed by: HOSPITALIST

## 2024-06-14 PROCEDURE — 76830 TRANSVAGINAL US NON-OB: CPT | Performed by: INTERNAL MEDICINE

## 2024-06-14 PROCEDURE — 76856 US EXAM PELVIC COMPLETE: CPT | Performed by: INTERNAL MEDICINE

## 2024-06-14 RX ORDER — HYDROCODONE BITARTRATE AND ACETAMINOPHEN 7.5; 325 MG/1; MG/1
1 TABLET ORAL EVERY 6 HOURS PRN
Qty: 15 TABLET | Refills: 0 | Status: SHIPPED | OUTPATIENT
Start: 2024-06-14

## 2024-06-14 RX ORDER — CIPROFLOXACIN 500 MG/1
500 TABLET, FILM COATED ORAL 2 TIMES DAILY
Qty: 28 TABLET | Refills: 0 | Status: SHIPPED | OUTPATIENT
Start: 2024-06-14 | End: 2024-06-28

## 2024-06-14 RX ORDER — AMOXICILLIN AND CLAVULANATE POTASSIUM 875; 125 MG/1; MG/1
1 TABLET, FILM COATED ORAL 2 TIMES DAILY
Qty: 28 TABLET | Refills: 0 | Status: SHIPPED | OUTPATIENT
Start: 2024-06-14 | End: 2024-06-28

## 2024-06-14 RX ORDER — MAGNESIUM CARB/ALUMINUM HYDROX 105-160MG
30 TABLET,CHEWABLE ORAL
Status: DISCONTINUED | OUTPATIENT
Start: 2024-06-14 | End: 2024-06-14

## 2024-06-14 NOTE — PLAN OF CARE
Patient reports streak of light pink vaginal discharge this morning on rodri-pad. Continues to have intermittent low pelvic pain, describes as sharp/pinching, tolerable norco as needed. IV antibiotics as ordered. Seen by infectious disease doctor, order for pelvic ultrasound, added IV Mycamine. Blood sugars within normal.   Problem: Patient Centered Care  Goal: Patient preferences are identified and integrated in the patient's plan of care  Description: Interventions:  - What would you like us to know as we care for you?   - Provide timely, complete, and accurate information to patient/family  - Incorporate patient and family knowledge, values, beliefs, and cultural backgrounds into the planning and delivery of care  - Encourage patient/family to participate in care and decision-making at the level they choose  - Honor patient and family perspectives and choices  Outcome: Progressing     Problem: Patient/Family Goals  Goal: Patient/Family Long Term Goal  Description: Patient's Long Term Goal:     Interventions:  -   - See additional Care Plan goals for specific interventions  Outcome: Progressing  Goal: Patient/Family Short Term Goal  Description: Patient's Short Term Goal:     Interventions:   -   - See additional Care Plan goals for specific interventions  Outcome: Progressing     Problem: GASTROINTESTINAL - ADULT  Goal: Maintains or returns to baseline bowel function  Description: INTERVENTIONS:  - Assess bowel function  - Maintain adequate hydration with IV or PO as ordered and tolerated  - Evaluate effectiveness of GI medications  - Encourage mobilization and activity  - Obtain nutritional consult as needed  - Establish a toileting routine/schedule  - Consider collaborating with pharmacy to review patient's medication profile  Outcome: Progressing     Problem: METABOLIC/FLUID AND ELECTROLYTES - ADULT  Goal: Glucose maintained within prescribed range  Description: INTERVENTIONS:  - Monitor Blood Glucose as  ordered  - Assess for signs and symptoms of hyperglycemia and hypoglycemia  - Administer ordered medications to maintain glucose within target range  - Assess barriers to adequate nutritional intake and initiate nutrition consult as needed  - Instruct patient on self management of diabetes  Outcome: Progressing     Problem: PAIN - ADULT  Goal: Verbalizes/displays adequate comfort level or patient's stated pain goal  Description: INTERVENTIONS:  - Encourage pt to monitor pain and request assistance  - Assess pain using appropriate pain scale  - Administer analgesics based on type and severity of pain and evaluate response  - Implement non-pharmacological measures as appropriate and evaluate response  - Consider cultural and social influences on pain and pain management  - Manage/alleviate anxiety  - Utilize distraction and/or relaxation techniques  - Monitor for opioid side effects  - Notify MD/LIP if interventions unsuccessful or patient reports new pain  - Anticipate increased pain with activity and pre-medicate as appropriate  Outcome: Progressing     Problem: SAFETY ADULT - FALL  Goal: Free from fall injury  Description: INTERVENTIONS:  - Assess pt frequently for physical needs  - Identify cognitive and physical deficits and behaviors that affect risk of falls.  - [place] fall precautions as indicated by assessment.  - Educate pt/family on patient safety including physical limitations  - Instruct pt to call for assistance with activity based on assessment  - Modify environment to reduce risk of injury  - Provide assistive devices as appropriate  - Consider OT/PT consult to assist with strengthening/mobility  - Encourage toileting schedule  Outcome: Progressing     Problem: Diabetes/Glucose Control  Goal: Glucose maintained within prescribed range  Description: INTERVENTIONS:  - Monitor Blood Glucose as ordered  - Assess for signs and symptoms of hyperglycemia and hypoglycemia  - Administer ordered medications  to maintain glucose within target range  - Assess barriers to adequate nutritional intake and initiate nutrition consult as needed  - Instruct patient on self management of diabetes  Outcome: Progressing

## 2024-06-14 NOTE — CM/SW NOTE
06/14/24 1300   Discharge disposition   Expected discharge disposition Home or Self   DME/Infusion Providers [facility]   Discharge transportation Private car     Will receive a total of two infusions two weeks apart at the [facility].  Patient was given information by Araceli from the [facility] group.    Plan for dc today.    Awilda Milan MBA BSN RN CRRN   RN Case Manager  [phone]

## 2024-06-14 NOTE — PROGRESS NOTES
[facility]  part of [facility]    Progress Note    Linda Unger Patient Status:  Inpatient    1985 MRN [id number]   Location [facility] 1W Attending Patric Grande MD   Hosp Day # 4 PCP ALEXEY MANZANARES     Subjective:  Feel ok   no bm  tolerating diet    Objective/Physical Exam:  General: Alert, orientated x3.  Cooperative.  No apparent distress.  Vital Signs:  Blood pressure 100/61, pulse 78, temperature 97 °F (36.1 °C), temperature source Temporal, resp. rate 18, height 5' 1\" (1.549 m), weight 233 lb 9.6 oz (106 kg), SpO2 94%.  Abdomen:  Soft, non-distended   vague discomfort LLQ  Labs:  Lab Results   Component Value Date    WBC 8.5 2024    HGB 13.6 2024    HCT 40.9 2024    .0 2024    CREATSERUM 0.68 2024    BUN <5 (L) 2024     2024    K 4.2 2024     2024    CO2 29.0 2024     (H) 2024    CA 9.3 2024    ALB 4.4 06/10/2024    ALKPHO 138 (H) 06/10/2024    BILT 0.8 06/10/2024    TP 7.5 06/10/2024    AST 26 06/10/2024    ALT 21 06/10/2024    LIP 48 06/10/2024    ESRML 5 2020    CRP 0.30 2020    MG 2.0 2024       Imaging:  CT ABDOMEN+PELVIS(CONTRAST ONLY)(CPT=74177)    Result Date: 2024  PROCEDURE: CT ABDOMEN + PELVIS (CONTRAST ONLY) (CPT=74177)  COMPARISON: [facility], CT ABDOMEN + PELVIS (CONTRAST ONLY) (CPT=74177), 6/10/2024, 5:16 PM.  INDICATIONS: pelvic abscess  TECHNIQUE: CT images of the abdomen and pelvis were obtained with non-ionic intravenous contrast material.  Automated exposure control for dose reduction was used. Adjustment of the mA and/or kV was done based on the patient's size. Use of iterative reconstruction technique for dose reduction was used.  Dose information is transmitted to the ACR (American College of Radiology) NRDR (National Radiology Data Registry) which includes the Dose Index Registry.  FINDINGS:  Normal lower  chest.  Coronary arteries are not visible  Liver may be fatty but there is no evidence of cirrhosis or portal hypertension  Normal gallbladder, pancreas, spleen, adrenals, kidneys  Moderate ostial narrowing of the celiac origin may be due to median arcuate ligament compression.  Good distal opacification  No adenopathy, ascites, free air  Decreased inflammation around the sigmoid colon and decreased small thin-walled pericolonic fluid collection now 2.6 x 1.3 centimeter.  Few tiny sigmoid diverticula again noted.  No fistula  Normal appendix  Hysterectomy.  No pelvic mass  Normal bladder  No bone lesion         CONCLUSION: Decreasing pelvic fat stranding and pelvic fluid collection    Dictated by (CST): Rohan Conner MD on 6/13/2024 at 12:48 PM     Finalized by (CST): Rohan Conner MD on 6/13/2024 at 12:52 PM          CT ABDOMEN+PELVIS(CONTRAST ONLY)(CPT=74177)    Result Date: 6/10/2024  PROCEDURE: CT ABDOMEN + PELVIS (CONTRAST ONLY) (CPT=74177)  COMPARISON: None.  INDICATIONS: back and abdominal pain  TECHNIQUE: CT images of the abdomen and pelvis were obtained with non-ionic intravenous contrast material.  Automated exposure control for dose reduction was used. Adjustment of the mA and/or kV was done based on the patient's size. Use of iterative reconstruction technique for dose reduction was used.  Dose information is transmitted to the ACR (American College of Radiology) NRDR (National Radiology Data Registry) which includes the Dose Index Registry.  FINDINGS:  LUNG BASES: No focal consolidation. LIVER: Probable steatosis. SPLEEN: No enlargement or focal lesion.  GALLBLADDER: No cholelithiasis. PANCREAS: No lesion, fluid collection, ductal dilatation, or atrophy.  ADRENALS: No defined mass or abnormal enlargement.  KIDNEYS: Enhance symmetrically without hydronephrosis. AORTA/VASCULAR:   Abdominal aorta is normal in caliber. ABDOMINAL NODES: No mass or adenopathy.  BOWEL/MESENTERY:  No dilated loops of bowel.   The appendix is unremarkable.  Just caudal to the distal sigmoid, there is an ill-defined hypodensity measuring 3.8 x 3.4 x 4.2 cm there is fat stranding surrounding this hypodense structure concerning for fluid collection/abscess.  No definite air seen within this localized fluid collection.  This fluid collection appears to be some located above the level of the vaginal cuff.  The adjacent distal sigmoid colon and wall appears mildly thickened and there is some mild adjacent fat stranding.  There is no evidence of pneumatosis or pneumoperitoneum. ABDOMINAL WALL: Unremarkable. URINARY BLADDER: No focal wall thickening or calculus.  PELVIC NODES: No adenopathy.   PELVIC ORGANS: Surgically absent uterus. BONES:   Spondylosis lower thoracic and lumbar spine. OTHER: Negative.          CONCLUSION:   3.8 x 3.4 x 4.2 cm localized fluid collection in the midline of the lower pelvis inferior to the distal sigmoid colon and superior to the vaginal cuff with surrounding fat stranding.  This collection is concerning for an abscess.  The adjacent rectosigmoid wall is mildly thickened with some adjacent fat stranding which could be reactive or reflect a nonspecific colitis.  No evidence of pneumoperitoneum or pneumatosis.    Dictated by (CST): Robert Arroyo MD on 6/10/2024 at 5:32 PM     Finalized by (CST): Robert Arroyo MD on 6/10/2024 at 5:42 PM            Assessment/Plan:  Patient Active Problem List   Diagnosis    Psoriasis    Psoriatic arthritis (HCC)    Pre-diabetes    Glaucoma    Intra-abdominal abscess (HCC)    Leukocytosis, unspecified type   CT   decrease abscess  Inflammation may be secondary to diverticular abscess   also possibility of colo-vaginal fistula   Since pt improving slowly could consider discharge on antibiotics (oral or IV)  To see in office    Advise colonoscopy as out pt as soon as safe per GI  Gyne etiology of abscess is still unknown        REINA MEDINA MD  6/14/2024  9:38 AM

## 2024-06-14 NOTE — PAYOR COMM NOTE
--------------  DISCHARGE REVIEW    Payor: [facility]  Subscriber #:  [id number]  Authorization Number: [id number]    Admit date: 6/10/24  Admit time:   7:50 PM  Discharge Date: 2024  3:30 PM     Admitting Physician: Shahzad Larson MD  Attending Physician:  No att. providers found  Primary Care Physician: Alexey Manzanares          Discharge Summary Notes        Discharge Summary signed by Patric Grande MD at 2024  2:15 PM       Author: Patric Grande MD Specialty: HOSPITALIST Author Type: Physician    Filed: 2024  2:15 PM Date of Service: 2024  2:12 PM Status: Signed    : Patric Grande MD (Physician)           [facility]  part of [facility]     DISCHARGE SUMMARY     Linda Unger Patient Status:  Inpatient    1985 MRN [id number]   Location [facility] 1W Attending Patric Grande MD   Hosp Day # 4 PCP ALEXEY MANZANARES     DATE OF ADMISSION: 6/10/2024  DATE OF DISCHARGE:  24  DISPOSITION: home  CONDITION ON DISCHARGE: good    DISCHARGE DIAGNOSES:  Pelvic abscess  Morbid obesity BMI 44  Type 2 diabetes  Hypertension  Dyslipidemia  SARAH  Psoriatic arthritis    HISTORY OF PRESENT ILLNESS (COPIED FROM ADMISSION H&P)  Patient is a 38-year-old  female who presented today to the emergency department for evaluation of progressive lower abdominal pain for the last 5 days.  CBC showed white blood cell count of 19.9 with left shift.  Chemistry and liver function tests were unremarkable.  Lipase was negative.  CT scan of the abdomen showed 3.8 x 3.4 x 4.2 cm localized fluid collection in the midline of the lower pelvis inferior to the distal sigmoid colon and superior to the vaginal cuff with surrounding fat stranding, collection is concerning for an abscess.  The adjacent rectosigmoid wall is mildly thickened with some adjacent fat stranding, could be reactive.  No evidence of pneumoperitoneum or pneumatosis.  Patient  was started on IV Zosyn.  She will be admitted to the hospital for further management.    HOSPITAL COURSE:  Patient was admitted, seen by gynecology, general surgery, interventional radiology.  Initially pelvic abscess was not well organized enough to be drained.  Patient was started on IV antibiotics and start to feel little bit better.  Then she had an episode which sounded like spontaneous drainage through the vagina.  After this her pain was a lot better.  White count normalized.  The exact nature of her abscess was not clear.  She does not have uterus fallopian tubes or ovaries.  The situation is likely to be related to her recent surgery.  Diverticulitis cannot be completely ruled out but was not specifically evident on CT scan.  Follow-up CT showed significant improvement.  Follow-up pelvic ultrasound shows only a small fluid collection.  Given the presence of Candida glabrata and Prevotella patient was started on Augmentin, Cipro, IV antifungal which will be administered as an outpatient.  She understands that she will need a colonoscopy in the near future.  She will follow-up with infectious disease during her treatment course.    Patient understands return to the emergency room for increased pain, fever, discharge, shortness of breath, chest pain, new neurologic symptoms, other concerning symptoms.    PHYSICAL EXAM:  Temp:  [97 °F (36.1 °C)-97.8 °F (36.6 °C)] 97 °F (36.1 °C)  Pulse:  [78-89] 78  Resp:  [16-18] 18  BP: (100-141)/(61-89) 100/61  SpO2:  [94 %-97 %] 94 %  Gen: A+Ox3.  No distress.   HEENT: NCAT, neck supple, no carotid bruit.  CV: RRR, S1S2, and intact distal pulses. No gallop, rub, murmur.  Pulm: Effort and breath sounds normal. No distress, wheezes, rales, rhonchi.  Abd: Soft, NTND, BS normal, no mass, no HSM, no rebound/guarding.   Neuro: Normal reflexes, CN. Sensory/motor exams grossly normal deficit. Coordination  and gait normal.   MS: No joint effusions.  No peripheral edema.  Skin:  Skin is warm and dry. No rashes, erythema, diaphoresis.   Psych: Normal mood and affect. Behavior and judgment normal.     DISCHARGE MEDICATIONS     Discharge Medications        START taking these medications        Instructions Prescription details   amoxicillin clavulanate 875-125 MG Tabs  Commonly known as: Augmentin      Take 1 tablet by mouth 2 (two) times daily for 14 days.   Stop taking on: June 28, 2024  Quantity: 28 tablet  Refills: 0     ciprofloxacin 500 MG Tabs  Commonly known as: Cipro      Take 1 tablet (500 mg total) by mouth 2 (two) times daily for 14 days.   Stop taking on: June 28, 2024  Quantity: 28 tablet  Refills: 0            CONTINUE taking these medications        Instructions Prescription details   ALPRAZolam 0.5 MG Tabs  Commonly known as: Xanax      Take 1 tablet (0.5 mg total) by mouth 3 (three) times daily as needed for Anxiety.   Refills: 0     ARIPiprazole 15 MG Tabs  Commonly known as: Abilify      Take 1 tablet (15 mg total) by mouth daily.   Refills: 0     baclofen 20 MG Tabs  Commonly known as: Lioresal       Refills: 4     Richard Contour Next Test Strp      U 1 STRIP BID UTD   Refills: 5     Calcipotriene 0.005 % Soln      APPLY TO SCALP BID FOR PSORIASIS   Refills: 2     calcipotriene 0.005 % Crea  Commonly known as: DOVONEX      JORDAN EXT AA 1 TO 2 TIMES A DAY   Refills: 0     cetirizine 10 MG Tabs  Commonly known as: ZyrTEC      Take 1 tablet (10 mg total) by mouth daily as needed for Allergies.   Refills: 0     clobetasol 0.05 % Crea  Commonly known as: Temovate      Apply topically.   Refills: 0     clobetasol 0.05 % Oint  Commonly known as: Temovate      JORDAN AA BID   Refills: 2     Doxycycline Hyclate 100 MG Tabs  Commonly known as: VIBRAMYCIN      TK 1 T PO BID FOR ACNE AND CYSTS   Refills: 0     flunisolide 25 MCG/ACT (0.025%) Soln       Refills: 4     HYDROcodone-acetaminophen  MG Tabs  Commonly known as: Norco      Take 1 tablet by mouth 2 (two) times daily.    Refills: 0     hydrOXYzine 25 MG Tabs  Commonly known as: Atarax      Take 1 tablet (25 mg total) by mouth daily.   Refills: 0     Jardiance 25 MG Tabs  Generic drug: Empagliflozin      Take 25 mg by mouth daily.   Refills: 0     lisinopril 5 MG Tabs  Commonly known as: Prinivil; Zestril      TK 1 T PO QD FOR BP   Refills: 0     losartan 100 MG Tabs  Commonly known as: Cozaar      TK 1 T PO  QAM   Refills: 0     Meloxicam 15 MG Tabs      Take 1 tablet (15 mg total) by mouth daily as needed for Pain.   Refills: 0     metFORMIN HCl 1000 MG Tabs  Commonly known as: GLUCOPHAGE      Take 0.75 tablets (750 mg total) by mouth daily.   Refills: 2     Microlet Lancets Misc      U 1 LANCET BID UTD   Refills: 5     multivitamin Chew      Chew 1 tablet by mouth daily.   Refills: 0     Stelara 45 MG/0.5ML Sosy  Generic drug: Ustekinumab      Inject 45 mg into the skin every 3 (three) months.   Quantity: 1 Syringe  Refills: 1     Trulicity 3 MG/0.5ML Sopn  Generic drug: Dulaglutide      Inject 3 mg into the skin every 7 days.   Refills: 0     Vitamin D3 1.25 MG (14578 UT) Caps      Take by mouth. 1 capsule weekly   Refills: 0     Wellbutrin  MG Tb24  Generic drug: buPROPion ER      Take 1 tablet (300 mg total) by mouth daily.   Refills: 0               Where to Get Your Medications        These medications were sent to [id number] - [place], [place] - [facility] [address] AT [facility], [phone], [phone]  [address], [place] [address]      Phone: [phone]   amoxicillin clavulanate 875-125 MG Tabs  ciprofloxacin 500 MG Tabs         CONSULTANTS  Consultants         Provider   Role Specialty     David Hernandez MD      Consulting Physician INFECTIOUS DISEASES     Clark Matias MD      Consulting Physician INTERVENTIONAL, RADIOLOGY     Valdemar Rojas MD      Consulting Physician SURGERY, GENERAL     Zo Malloy MD      Consulting Physician OBSTETRICS & GYNECOLOGY             FOLLOW UP:  [address]  [address]  [facility] [address]  [phone]    Follow up in 1 week(s)  Post Discharge Followup    David Hernandez MD  [address]  [address] [address]  [phone]    Follow up in 1 week(s)  Post Discharge Followup    The above plan and follow-up instructions were reviewed with the patient and they verbalized understanding and agreement.  They understand to return to the emergency room for any concerning signs or symptoms.  Greater than 30 minutes spent on discharge.  -----------------------    Hospital Discharge Diagnoses:  Pelvic abscess    Lace+ Score: 20  59-90 High Risk  29-58 Medium Risk  0-28   Low Risk.    TCM Follow-Up Recommendation:  LACE < 29: Low Risk of readmission after discharge from the hospital. No TCM follow-up needed.    Electronically signed by Patric Grande MD on 6/14/2024  2:15 PM         REVIEWER COMMENTS

## 2024-06-14 NOTE — PROGRESS NOTES
,  INFECTIOUS DISEASE PROGRESS NOTE    Linda Unger Patient Status:  Inpatient    1985 MRN [id number]   Location [facility] 1W Attending Patric Grande MD   Hosp Day # 4 PCP ALEXEY MANZANARES       SUBJECTIVE  Tolerating abx. No f/c. No n/v.    ASSESSMENT/PLAN:    Antibiotics: IV zosyn     # Pelvic abscess with previous MEAGHAN/BSO 10/2023 with vaginal discharge; not well organized. Seems not related to sigmoid diverticulitis - unclear etiology - cx with C. Glabrata, Prevotella               - improved on repeat CT A/P   # Psoriatic arthritis on Tremfya  # Immunocompromised host        PLAN:     -  IV zosyn, IV micafungin  -  US noted - discharge on Augmentin + cipro + rezafungin x2 weeks  -  repeat US as outpatient pending clinical improvement  -  f/up cx  -  Follow fever curve, wbc  -  Reviewed labs, micro, imaging reports  -  d/w patient, RN, Dr. Grande     History of Present Illness:  Linda Unger is a a(n) 38 year old female. Patient is a 38-year-old female with a history of depression, PCOS, morbid obesity, psoriatic arthritis on Stelara who had a total laparoscopic hysterectomy in  with stage I ovarian and cervical cancer.  Now comes to the hospital on 6/10 with worsening lower abdominal pain especially with bowel movements and noted purulent discharge from the vagina. Also with constipation at home, dysuria, frequency. Denies fevers, chills.  Tmax 99.3.  Initial WBC 19.9 which is resolved.  Has been on IV Zosyn.  CT A/P on 6/10 with a 3.8 x 3.4 x 4.2 cm localized fluid collection in the midline of the lower pelvis inferior to the sigmoid colon and superior to the vaginal cuff with associated fat stranding concerning for an abscess repeat imaging in  with decreased fluid collection and fat stranding.  Seen by IR and noted fluid is not well organized and not able to be drained.    OBJECTIVE  /61 (BP Location: Left arm)   Pulse 78   Temp 97 °F (36.1 °C) (Temporal)   Resp 18    Ht 154.9 cm (5' 1\")   Wt 233 lb 9.6 oz (106 kg)   SpO2 94%   BMI 44.14 kg/m²     General: No acute distress. Alert.  HEENT: EOMI   Abdomen: Soft, nontender, nondistended.   Musculoskeletal: No edema  Integument: No rashes        MD Delmar Baz[facility]  [phone]

## 2024-06-14 NOTE — DISCHARGE SUMMARY
[facility]  part of [facility]     DISCHARGE SUMMARY     Linda Unger Patient Status:  Inpatient    1985 MRN [id number]   Location [facility] 1W Attending Patric Grande MD   Hosp Day # 4 PCP ALEXEY MANZANARES     DATE OF ADMISSION: 6/10/2024  DATE OF DISCHARGE:  24  DISPOSITION: home  CONDITION ON DISCHARGE: good    DISCHARGE DIAGNOSES:  Pelvic abscess  Morbid obesity BMI 44  Type 2 diabetes  Hypertension  Dyslipidemia  SARAH  Psoriatic arthritis    HISTORY OF PRESENT ILLNESS (COPIED FROM ADMISSION H&P)  Patient is a 38-year-old  female who presented today to the emergency department for evaluation of progressive lower abdominal pain for the last 5 days.  CBC showed white blood cell count of 19.9 with left shift.  Chemistry and liver function tests were unremarkable.  Lipase was negative.  CT scan of the abdomen showed 3.8 x 3.4 x 4.2 cm localized fluid collection in the midline of the lower pelvis inferior to the distal sigmoid colon and superior to the vaginal cuff with surrounding fat stranding, collection is concerning for an abscess.  The adjacent rectosigmoid wall is mildly thickened with some adjacent fat stranding, could be reactive.  No evidence of pneumoperitoneum or pneumatosis.  Patient was started on IV Zosyn.  She will be admitted to the hospital for further management.    HOSPITAL COURSE:  Patient was admitted, seen by gynecology, general surgery, interventional radiology.  Initially pelvic abscess was not well organized enough to be drained.  Patient was started on IV antibiotics and start to feel little bit better.  Then she had an episode which sounded like spontaneous drainage through the vagina.  After this her pain was a lot better.  White count normalized.  The exact nature of her abscess was not clear.  She does not have uterus fallopian tubes or ovaries.  The situation is likely to be related to her recent surgery.  Diverticulitis cannot be  completely ruled out but was not specifically evident on CT scan.  Follow-up CT showed significant improvement.  Follow-up pelvic ultrasound shows only a small fluid collection.  Given the presence of Candida glabrata and Prevotella patient was started on Augmentin, Cipro, IV antifungal which will be administered as an outpatient.  She understands that she will need a colonoscopy in the near future.  She will follow-up with infectious disease during her treatment course.    Patient understands return to the emergency room for increased pain, fever, discharge, shortness of breath, chest pain, new neurologic symptoms, other concerning symptoms.    PHYSICAL EXAM:  Temp:  [97 °F (36.1 °C)-97.8 °F (36.6 °C)] 97 °F (36.1 °C)  Pulse:  [78-89] 78  Resp:  [16-18] 18  BP: (100-141)/(61-89) 100/61  SpO2:  [94 %-97 %] 94 %  Gen: A+Ox3.  No distress.   HEENT: NCAT, neck supple, no carotid bruit.  CV: RRR, S1S2, and intact distal pulses. No gallop, rub, murmur.  Pulm: Effort and breath sounds normal. No distress, wheezes, rales, rhonchi.  Abd: Soft, NTND, BS normal, no mass, no HSM, no rebound/guarding.   Neuro: Normal reflexes, CN. Sensory/motor exams grossly normal deficit. Coordination  and gait normal.   MS: No joint effusions.  No peripheral edema.  Skin: Skin is warm and dry. No rashes, erythema, diaphoresis.   Psych: Normal mood and affect. Behavior and judgment normal.     DISCHARGE MEDICATIONS     Discharge Medications        START taking these medications        Instructions Prescription details   amoxicillin clavulanate 875-125 MG Tabs  Commonly known as: Augmentin      Take 1 tablet by mouth 2 (two) times daily for 14 days.   Stop taking on: June 28, 2024  Quantity: 28 tablet  Refills: 0     ciprofloxacin 500 MG Tabs  Commonly known as: Cipro      Take 1 tablet (500 mg total) by mouth 2 (two) times daily for 14 days.   Stop taking on: June 28, 2024  Quantity: 28 tablet  Refills: 0            CONTINUE taking these  medications        Instructions Prescription details   ALPRAZolam 0.5 MG Tabs  Commonly known as: Xanax      Take 1 tablet (0.5 mg total) by mouth 3 (three) times daily as needed for Anxiety.   Refills: 0     ARIPiprazole 15 MG Tabs  Commonly known as: Abilify      Take 1 tablet (15 mg total) by mouth daily.   Refills: 0     baclofen 20 MG Tabs  Commonly known as: Lioresal       Refills: 4     Richard Contour Next Test Strp      U 1 STRIP BID UTD   Refills: 5     Calcipotriene 0.005 % Soln      APPLY TO SCALP BID FOR PSORIASIS   Refills: 2     calcipotriene 0.005 % Crea  Commonly known as: DOVONEX      JORDAN EXT AA 1 TO 2 TIMES A DAY   Refills: 0     cetirizine 10 MG Tabs  Commonly known as: ZyrTEC      Take 1 tablet (10 mg total) by mouth daily as needed for Allergies.   Refills: 0     clobetasol 0.05 % Crea  Commonly known as: Temovate      Apply topically.   Refills: 0     clobetasol 0.05 % Oint  Commonly known as: Temovate      JORDAN AA BID   Refills: 2     Doxycycline Hyclate 100 MG Tabs  Commonly known as: VIBRAMYCIN      TK 1 T PO BID FOR ACNE AND CYSTS   Refills: 0     flunisolide 25 MCG/ACT (0.025%) Soln       Refills: 4     HYDROcodone-acetaminophen  MG Tabs  Commonly known as: Norco      Take 1 tablet by mouth 2 (two) times daily.   Refills: 0     hydrOXYzine 25 MG Tabs  Commonly known as: Atarax      Take 1 tablet (25 mg total) by mouth daily.   Refills: 0     Jardiance 25 MG Tabs  Generic drug: Empagliflozin      Take 25 mg by mouth daily.   Refills: 0     lisinopril 5 MG Tabs  Commonly known as: Prinivil; Zestril      TK 1 T PO QD FOR BP   Refills: 0     losartan 100 MG Tabs  Commonly known as: Cozaar      TK 1 T PO  QAM   Refills: 0     Meloxicam 15 MG Tabs      Take 1 tablet (15 mg total) by mouth daily as needed for Pain.   Refills: 0     metFORMIN HCl 1000 MG Tabs  Commonly known as: GLUCOPHAGE      Take 0.75 tablets (750 mg total) by mouth daily.   Refills: 2     Microlet Lancets Misc      U 1  LANCET BID UTD   Refills: 5     multivitamin Chew      Chew 1 tablet by mouth daily.   Refills: 0     Stelara 45 MG/0.5ML Sosy  Generic drug: Ustekinumab      Inject 45 mg into the skin every 3 (three) months.   Quantity: 1 Syringe  Refills: 1     Trulicity 3 MG/0.5ML Sopn  Generic drug: Dulaglutide      Inject 3 mg into the skin every 7 days.   Refills: 0     Vitamin D3 1.25 MG (07992 UT) Caps      Take by mouth. 1 capsule weekly   Refills: 0     Wellbutrin  MG Tb24  Generic drug: buPROPion ER      Take 1 tablet (300 mg total) by mouth daily.   Refills: 0               Where to Get Your Medications        These medications were sent to [id number] - [place], [place] - [address] AT [facility], [phone], [phone]  [address], [place] [address]      Phone: [phone]   amoxicillin clavulanate 875-125 MG Tabs  ciprofloxacin 500 MG Tabs         CONSULTANTS  Consultants         Provider   Role Specialty     David Hernandez MD      Consulting Physician INFECTIOUS DISEASES     Clark Matias MD      Consulting Physician INTERVENTIONAL, RADIOLOGY     Valdemar Rojas MD      Consulting Physician SURGERY, GENERAL     Zo Malloy MD      Consulting Physician OBSTETRICS & GYNECOLOGY            FOLLOW UP:  Gael Cardenas  [address] [facility] [address]  [phone]    Follow up in 1 week(s)  Post Discharge Followup    David Hernandez MD  [facility] [address]  [address] [id number] [phone]    Follow up in 1 week(s)  Post Discharge Followup    The above plan and follow-up instructions were reviewed with the patient and they verbalized understanding and agreement.  They understand to return to the emergency room for any concerning signs or symptoms.  Greater than 30 minutes spent on discharge.  -----------------------    Hospital Discharge Diagnoses:  Pelvic abscess    Lace+ Score: 20  59-90 High Risk  29-58 Medium Risk  0-28    Low Risk.    TCM Follow-Up Recommendation:  LACE < 29: Low Risk of readmission after discharge from the hospital. No TCM follow-up needed.

## 2024-06-14 NOTE — PLAN OF CARE
Problem: GASTROINTESTINAL - ADULT  Goal: Maintains or returns to baseline bowel function  Description: INTERVENTIONS:  - Assess bowel function  - Maintain adequate hydration with IV or PO as ordered and tolerated  - Evaluate effectiveness of GI medications  - Encourage mobilization and activity  - Obtain nutritional consult as needed  - Establish a toileting routine/schedule  - Consider collaborating with pharmacy to review patient's medication profile  Outcome: Progressing     Problem: METABOLIC/FLUID AND ELECTROLYTES - ADULT  Goal: Glucose maintained within prescribed range  Description: INTERVENTIONS:  - Monitor Blood Glucose as ordered  - Assess for signs and symptoms of hyperglycemia and hypoglycemia  - Administer ordered medications to maintain glucose within target range  - Assess barriers to adequate nutritional intake and initiate nutrition consult as needed  - Instruct patient on self management of diabetes  Outcome: Progressing     Problem: PAIN - ADULT  Goal: Verbalizes/displays adequate comfort level or patient's stated pain goal  Description: INTERVENTIONS:  - Encourage pt to monitor pain and request assistance  - Assess pain using appropriate pain scale  - Administer analgesics based on type and severity of pain and evaluate response  - Implement non-pharmacological measures as appropriate and evaluate response  - Consider cultural and social influences on pain and pain management  - Manage/alleviate anxiety  - Utilize distraction and/or relaxation techniques  - Monitor for opioid side effects  - Notify MD/LIP if interventions unsuccessful or patient reports new pain  - Anticipate increased pain with activity and pre-medicate as appropriate  Outcome: Progressing     Problem: SAFETY ADULT - FALL  Goal: Free from fall injury  Description: INTERVENTIONS:  - Assess pt frequently for physical needs  - Identify cognitive and physical deficits and behaviors that affect risk of falls.  - [place] fall  precautions as indicated by assessment.  - Educate pt/family on patient safety including physical limitations  - Instruct pt to call for assistance with activity based on assessment  - Modify environment to reduce risk of injury  - Provide assistive devices as appropriate  - Consider OT/PT consult to assist with strengthening/mobility  - Encourage toileting schedule  Outcome: Progressing     Problem: Diabetes/Glucose Control  Goal: Glucose maintained within prescribed range  Description: INTERVENTIONS:  - Monitor Blood Glucose as ordered  - Assess for signs and symptoms of hyperglycemia and hypoglycemia  - Administer ordered medications to maintain glucose within target range  - Assess barriers to adequate nutritional intake and initiate nutrition consult as needed  - Instruct patient on self management of diabetes  Outcome: Progressing

## 2024-06-14 NOTE — PROGRESS NOTES
[facility]  part of [facility]  Hospitalist Progress Note     Linda Unger Patient Status:  Inpatient    1985  38 year old CSN [id number]   Location [address] Attending Patric Grande MD   Hosp Day # 4 PCP ALEXEY MANZANARES     Assessment & Plan:   ----------------------------------  Pelvic abscess.  Ultimate etiology unclear though may be related to recent intercourse.  May have spontaneously drained on .  Noticed significant increase in discharge followed by relief of pain.  White count has normalized.  No clear evidence of diverticulitis.  Fistula cannot be completely ruled out though seems less likely.  Repeat CT  shows improvement, small residual fluid collection.  Patient considered relatively immunocompromised due to her psoriatic arthritis treatment.  Candida glabrata, Prevotella growing  -Gynecology, general surgery, IR consult appreciated  -ID consult appreciated  -Continue Zosyn micafungin  -May need IV anti-infectives on discharge  -Await pelvic ultrasound  -Pain control  -Increase activity    Other problems  Morbid obesity BMI 44  Type 2 diabetes  Hypertension  Dyslipidemia  SARAH  Psoriatic arthritis    dvt prophylaxis: sc heparin  code status: full code  dispo: home upon medical clearance    I personally reviewed the available laboratories, imaging including cultures. I discussed/will discuss the case with ID. I ordered laboratories, studies including CBC. I adjusted medications including micafungin. Medical decision making high, risk is high.    Subjective:   ----------------------------------  Low pelvic pain overall mild.  No other new complaints      Objective:   Chief Complaint:   Chief Complaint   Patient presents with    Abdomen/Flank Pain     ----------------------------------  Temp:  [97 °F (36.1 °C)-97.8 °F (36.6 °C)] 97 °F (36.1 °C)  Pulse:  [78-89] 78  Resp:  [16-18] 18  BP: (100-141)/(61-89) 100/61  SpO2:  [94 %-97 %] 94 %  Gen: A+Ox3.  No distress.    HEENT: NCAT, neck supple, no carotid bruit.  CV: RRR, S1S2, and intact distal pulses. No gallop, rub, murmur.  Pulm: Effort and breath sounds normal. No distress, wheezes, rales, rhonchi.  Abd: Soft, NTND, BS normal, no mass, no HSM, no rebound/guarding.   Neuro: Normal reflexes, CN. Sensory/motor exams grossly normal deficit.   MS: No joint effusions.  No peripheral edema.  Skin: Skin is warm and dry. No rashes, erythema, diaphoresis.   Psych: Normal mood and affect. Calm, cooperative    Labs:  Lab Results   Component Value Date    HGB 13.6 06/13/2024    WBC 8.5 06/13/2024    .0 06/13/2024     06/12/2024    K 4.2 06/12/2024    CREATSERUM 0.68 06/12/2024    AST 26 06/10/2024    ALT 21 06/10/2024          micafungin  100 mg Intravenous Q24H    heparin  5,000 Units Subcutaneous Q8H MANNIE    piperacillin-tazobactam  3.375 g Intravenous Q8H    insulin aspart  1-7 Units Subcutaneous TID CC       mineral oil    acetaminophen    HYDROcodone-acetaminophen    ondansetron    prochlorperazine    morphINE **OR** morphINE **OR** morphINE    temazepam    glucose **OR** glucose **OR** glucose-vitamin C **OR** dextrose **OR** glucose **OR** glucose **OR** glucose-vitamin C

## 2024-06-27 DIAGNOSIS — N73.9 PELVIC ABSCESS IN FEMALE: Primary | ICD-10-CM

## 2025-07-28 ENCOUNTER — HOSPITAL ENCOUNTER (OUTPATIENT)
Dept: CT IMAGING | Age: 40
Discharge: HOME | End: 2025-07-28
Attending: OBSTETRICS & GYNECOLOGY
Payer: MEDICAID

## 2025-07-28 DIAGNOSIS — C54.1 ENDOMETRIAL SARCOMA (HCC): ICD-10-CM

## 2025-07-28 DIAGNOSIS — R10.2 PELVIC PAIN SYNDROME: ICD-10-CM

## 2025-07-28 LAB
CREAT BLD-MCNC: 0.7 MG/DL (ref 0.55–1.02)
EGFRCR SERPLBLD CKD-EPI 2021: 111 ML/MIN/1.73M2 (ref 60–?)

## 2025-07-28 PROCEDURE — 82565 ASSAY OF CREATININE: CPT

## 2025-07-28 PROCEDURE — 74177 CT ABD & PELVIS W/CONTRAST: CPT | Performed by: OBSTETRICS & GYNECOLOGY

## 2025-08-15 ENCOUNTER — HOSPITAL ENCOUNTER (OUTPATIENT)
Dept: MAMMOGRAPHY | Age: 40
Discharge: HOME | End: 2025-08-15
Attending: INTERNAL MEDICINE

## 2025-08-15 ENCOUNTER — HOSPITAL ENCOUNTER (OUTPATIENT)
Dept: BONE DENSITY | Age: 40
Discharge: HOME | End: 2025-08-15
Attending: INTERNAL MEDICINE

## 2025-08-15 DIAGNOSIS — M85.9 LOW BONE DENSITY FOR AGE: ICD-10-CM

## 2025-08-15 DIAGNOSIS — Z12.31 ENCOUNTER FOR SCREENING MAMMOGRAM FOR MALIGNANT NEOPLASM OF BREAST: ICD-10-CM

## 2025-08-15 PROCEDURE — 77063 BREAST TOMOSYNTHESIS BI: CPT | Performed by: INTERNAL MEDICINE

## 2025-08-15 PROCEDURE — 77067 SCR MAMMO BI INCL CAD: CPT | Performed by: INTERNAL MEDICINE

## 2025-08-15 PROCEDURE — 77080 DXA BONE DENSITY AXIAL: CPT | Performed by: INTERNAL MEDICINE
